# Patient Record
Sex: FEMALE | Race: WHITE | NOT HISPANIC OR LATINO | Employment: UNEMPLOYED | URBAN - METROPOLITAN AREA
[De-identification: names, ages, dates, MRNs, and addresses within clinical notes are randomized per-mention and may not be internally consistent; named-entity substitution may affect disease eponyms.]

---

## 2023-05-24 PROBLEM — Z34.80 SUPERVISION OF OTHER NORMAL PREGNANCY, ANTEPARTUM: Status: ACTIVE | Noted: 2023-05-24

## 2023-08-02 PROBLEM — O99.210 MATERNAL OBESITY AFFECTING PREGNANCY, ANTEPARTUM: Status: ACTIVE | Noted: 2023-08-02

## 2023-08-30 ENCOUNTER — ROUTINE PRENATAL (OUTPATIENT)
Dept: OBSTETRICS AND GYNECOLOGY | Facility: CLINIC | Age: 23
End: 2023-08-30
Payer: COMMERCIAL

## 2023-08-30 ENCOUNTER — OFFICE VISIT (OUTPATIENT)
Dept: OBSTETRICS AND GYNECOLOGY | Facility: HOSPITAL | Age: 23
End: 2023-08-30
Payer: COMMERCIAL

## 2023-08-30 ENCOUNTER — HOSPITAL ENCOUNTER (OUTPATIENT)
Dept: WOMENS IMAGING | Facility: HOSPITAL | Age: 23
Discharge: HOME | End: 2023-08-30
Admitting: OBSTETRICS & GYNECOLOGY
Payer: COMMERCIAL

## 2023-08-30 VITALS — DIASTOLIC BLOOD PRESSURE: 80 MMHG | SYSTOLIC BLOOD PRESSURE: 122 MMHG | WEIGHT: 228.6 LBS | BODY MASS INDEX: 37.46 KG/M2

## 2023-08-30 VITALS
WEIGHT: 226.6 LBS | DIASTOLIC BLOOD PRESSURE: 60 MMHG | BODY MASS INDEX: 36.42 KG/M2 | SYSTOLIC BLOOD PRESSURE: 113 MMHG | HEIGHT: 66 IN

## 2023-08-30 DIAGNOSIS — Z03.73 FETAL ANOMALY SUSPECTED BUT NOT FOUND: ICD-10-CM

## 2023-08-30 DIAGNOSIS — Z34.92 PRENATAL CARE IN SECOND TRIMESTER: Primary | ICD-10-CM

## 2023-08-30 DIAGNOSIS — Z34.90 PREGNANCY, UNSPECIFIED GESTATIONAL AGE: ICD-10-CM

## 2023-08-30 DIAGNOSIS — Z34.80 SUPERVISION OF OTHER NORMAL PREGNANCY, ANTEPARTUM: ICD-10-CM

## 2023-08-30 DIAGNOSIS — O99.210 MATERNAL OBESITY AFFECTING PREGNANCY, ANTEPARTUM: Primary | ICD-10-CM

## 2023-08-30 LAB
GLUCOSE UR STRIP-MCNC: NEGATIVE MG/DL
PROT UR STRIP-MCNC: NEGATIVE MG/DL

## 2023-08-30 PROCEDURE — 76811 OB US DETAILED SNGL FETUS: CPT

## 2023-08-30 NOTE — PROGRESS NOTES
OB FOLLOW UP  CC- Here for care of pregnancy        Heather Garcia is a 22 y.o.  27w2d patient being seen today for her obstetrical follow up. Patient reports no complaints..     Reviewed L&D note from [facility] and no mention of 4th degree laceration but confirmed vacuum delivery. Discussed findings and will continue to monitor fetal growth.     Patient undergoing Glucola testing today.     MBT: O+  Rhogam:  not needed  28 week packet: reviewed with patient , counseled on fetal movement , pediatrician list reviewed, breast pump discussed, and childbirth classes reviewed  TDAP: given today  Ultrasound Today: Yes at [facility]      Patient Active Problem List   Diagnosis    Supervision of other normal pregnancy, antepartum    Maternal obesity affecting pregnancy, antepartum         ROS -   Patient Reports : No Problems  Patient Denies: Loss of Fluid, Vaginal Spotting, Vision Changes, Headaches, Nausea , Vomiting , Contractions, and Epigastric pain  Fetal Movement : normal    The additional following portions of the patient's history were reviewed and updated as appropriate: allergies, current medications, past family history, past medical history, past social history, past surgical history, and problem list.    I have reviewed and agree with the HPI, ROS, and historical information as entered above. Woodrow Agee MD      /80   Wt 104 kg (228 lb 9.6 oz)   LMP 2023   BMI 37.46 kg/mý         EXAM:     Prenatal Vitals  BP: 122/80  Weight: 104 kg (228 lb 9.6 oz)                   Urine Glucose Read-only: Negative  Urine Protein Read-only: Negative         Assessment and Plan    Problem List Items Addressed This Visit    None  Visit Diagnoses       Prenatal care in second trimester    -  Primary    Relevant Orders    CBC (No Diff)    Gestational Screen 1 Hr ([facility])    Antibody Screen    POC Urinalysis Dipstick (Completed)            Pregnancy at 27w2d  1 hr Glucola, CBC, and antibody screen  today  and TDAP given today  Fetal movement/PTL or Labor precautions  Lab(s) Ordered  Patient is on Prenatal vitamins  Reviewed Pre-eclampsia signs/symptoms  Activity and Exercise discussed.  Return in about 2 weeks (around 9/13/2023) for Routine prenatal care.    Woodrow Agee MD  08/30/2023

## 2023-08-30 NOTE — PROGRESS NOTES
No complaints today.  Next f/ with forester today, Planning to talk to him about previous delivery with 4th degree.  Vaginal del vs p c/s   NIPT Low risk, AFP neg

## 2023-08-31 LAB
BLD GP AB SCN SERPL QL: NEGATIVE
ERYTHROCYTE [DISTWIDTH] IN BLOOD BY AUTOMATED COUNT: 13 % (ref 12.3–15.4)
GLUCOSE 1H P 50 G GLC PO SERPL-MCNC: 65 MG/DL (ref 65–139)
HCT VFR BLD AUTO: 37.4 % (ref 34–46.6)
HGB BLD-MCNC: 12.9 G/DL (ref 12–15.9)
MCH RBC QN AUTO: 30.2 PG (ref 26.6–33)
MCHC RBC AUTO-ENTMCNC: 34.5 G/DL (ref 31.5–35.7)
MCV RBC AUTO: 87.6 FL (ref 79–97)
PLATELET # BLD AUTO: 268 10*3/MM3 (ref 140–450)
RBC # BLD AUTO: 4.27 10*6/MM3 (ref 3.77–5.28)
WBC # BLD AUTO: 8.9 10*3/MM3 (ref 3.4–10.8)

## 2023-09-13 ENCOUNTER — ROUTINE PRENATAL (OUTPATIENT)
Dept: OBSTETRICS AND GYNECOLOGY | Facility: CLINIC | Age: 23
End: 2023-09-13
Payer: COMMERCIAL

## 2023-09-13 VITALS — BODY MASS INDEX: 37.89 KG/M2 | SYSTOLIC BLOOD PRESSURE: 118 MMHG | DIASTOLIC BLOOD PRESSURE: 70 MMHG | WEIGHT: 231.2 LBS

## 2023-09-13 DIAGNOSIS — Z34.92 PRENATAL CARE IN SECOND TRIMESTER: Primary | ICD-10-CM

## 2023-09-13 LAB
CLARITY, POC: CLEAR
COLOR UR: ABNORMAL
GLUCOSE UR STRIP-MCNC: NEGATIVE MG/DL
PROT UR STRIP-MCNC: ABNORMAL MG/DL

## 2023-09-13 NOTE — PATIENT INSTRUCTIONS
Prenatal Care  Prenatal care is health care during pregnancy. It helps you and your unborn baby (fetus) stay as healthy as possible. Prenatal care may be provided by a midwife, a family practice doctor, a mid-level practitioner (nurse practitioner or physician assistant), or a childbirth and pregnancy doctor (obstetrician).  How does this affect me?  During pregnancy, you will be closely monitored for any new conditions that might develop. To lower your risk of pregnancy complications, you and your health care provider will talk about any underlying conditions you have.  How does this affect my baby?  Early and consistent prenatal care increases the chance that your baby will be healthy during pregnancy. Prenatal care lowers the risk that your baby will be:  Born early (prematurely).  Smaller than expected at birth (small for gestational age).  What can I expect at the first prenatal care visit?  Your first prenatal care visit will likely be the longest. You should schedule your first prenatal care visit as soon as you know that you are pregnant. Your first visit is a good time to talk about any questions or concerns you have about pregnancy.  Medical history  At your visit, you and your health care provider will talk about your medical history, including:  Any past pregnancies.  Your family's medical history.  Medical history of the baby's father.  Any long-term (chronic) health conditions you have and how you manage them.  Any surgeries or procedures you have had.  Any current over-the-counter or prescription medicines, herbs, or supplements that you are taking.  Other factors that could pose a risk to your baby, including:  Exposure to harmful chemicals or radiation at work or at home.  Any substance use, including tobacco, alcohol, and drug use.  Your home setting and your stress levels, including:  Exposure to abuse or violence.  Household financial strain.  Your daily health habits, including diet and  exercise.  Tests and screenings  Your health care provider will:  Measure your weight, height, and blood pressure.  Do a physical exam, including a pelvic and breast exam.  Perform blood tests and urine tests to check for:  Urinary tract infection.  Sexually transmitted infections (STIs).  Low iron levels in your blood (anemia).  Blood type and certain proteins on red blood cells (Rh antibodies).  Infections and immunity to viruses, such as hepatitis B and rubella.  HIV (human immunodeficiency virus).  Discuss your options for genetic screening.  Tips about staying healthy  Your health care provider will also give you information about how to keep yourself and your baby healthy, including:  Nutrition and taking vitamins.  Physical activity.  How to manage pregnancy symptoms such as nausea and vomiting (morning sickness).  Infections and substances that may be harmful to your baby and how to avoid them.  Food safety.  Dental care.  Working.  Travel.  Warning signs to watch for and when to call your health care provider.  How often will I have prenatal care visits?  After your first prenatal care visit, you will have regular visits throughout your pregnancy. The visit schedule is often as follows:  Up to week 28 of pregnancy: once every 4 weeks.  28-36 weeks: once every 2 weeks.  After 36 weeks: every week until delivery.  Some women may have visits more or less often depending on any underlying health conditions and the health of the baby.  Keep all follow-up and prenatal care visits. This is important.  What happens during routine prenatal care visits?  Your health care provider will:  Measure your weight and blood pressure.  Check for fetal heart sounds.  Measure the height of your uterus in your abdomen (fundal height). This may be measured starting around week 20 of pregnancy.  Check the position of your baby inside your uterus.  Ask questions about your diet, sleeping patterns, and whether you can feel the baby  move.  Review warning signs to watch for and signs of labor.  Ask about any pregnancy symptoms you are having and how you are dealing with them. Symptoms may include:  Headaches.  Nausea and vomiting.  Vaginal discharge.  Swelling.  Fatigue.  Constipation.  Changes in your vision.  Feeling persistently sad or anxious.  Any discomfort, including back or pelvic pain.  Bleeding or spotting.  Make a list of questions to ask your health care provider at your routine visits.  What tests might I have during prenatal care visits?  You may have blood, urine, and imaging tests throughout your pregnancy, such as:  Urine tests to check for glucose, protein, or signs of infection.  Glucose tests to check for a form of diabetes that can develop during pregnancy (gestational diabetes mellitus). This is usually done around week 24 of pregnancy.  Ultrasounds to check your baby's growth and development, to check for birth defects, and to check your baby's well-being. These can also help to decide when you should deliver your baby.  A test to check for group B strep (GBS) infection. This is usually done around week 36 of pregnancy.  Genetic testing. This may include blood, fluid, or tissue sampling, or imaging tests, such as an ultrasound. Some genetic tests are done during the first trimester and some are done during the second trimester.  What else can I expect during prenatal care visits?  Your health care provider may recommend getting certain vaccines during pregnancy. These may include:  A yearly flu shot (annual influenza vaccine). This is especially important if you will be pregnant during flu season.  Tdap (tetanus, diphtheria, pertussis) vaccine. Getting this vaccine during pregnancy can protect your baby from whooping cough (pertussis) after birth. This vaccine may be recommended between weeks 27 and 36 of pregnancy.  A COVID-19 vaccine.  Later in your pregnancy, your health care provider may give you information  about:  Childbirth and breastfeeding classes.  Choosing a health care provider for your baby.  Umbilical cord banking.  Breastfeeding.  Birth control after your baby is born.  The hospital labor and delivery unit and how to set up a tour.  Registering at the hospital before you go into labor.  Where to find more information  Office on Women's Health: womenshealth.gov  American Pregnancy Association: americanpregnancy.org  March of Dimes: marchofdimes.org  Summary  Prenatal care helps you and your baby stay as healthy as possible during pregnancy.  Your first prenatal care visit will most likely be the longest.  You will have visits and tests throughout your pregnancy to monitor your health and your baby's health.  Bring a list of questions to your visits to ask your health care provider.  Make sure to keep all follow-up and prenatal care visits.  This information is not intended to replace advice given to you by your health care provider. Make sure you discuss any questions you have with your health care provider.  Document Revised: 09/30/2021 Document Reviewed: 09/30/2021  [employer] Patient Education © 2023 [employer]

## 2023-09-13 NOTE — PROGRESS NOTES
OB FOLLOW UP  CC- Here for care of pregnancy        Heather Garcia is a 22 y.o.  29w2d patient being seen today for her obstetrical follow up visit. Patient reports no complaints.     Her prenatal care is complicated by (and status) :    Patient Active Problem List   Diagnosis    Supervision of other normal pregnancy, antepartum    Maternal obesity affecting pregnancy, antepartum       TDAP status: received at last visit  Rhogam status: was not indicated  28 week labs: Reviewed and normal  Ultrasound Today: No  Non Stress Test: No.      ROS -   Patient Denies: Loss of Fluid, Vaginal Spotting, Vision Changes, Headaches, Nausea , Vomiting , Contractions, and Epigastric pain  Fetal Movement : normal  All other systems reviewed and are negative.       The additional following portions of the patient's history were reviewed and updated as appropriate: allergies, current medications, past family history, past medical history, past social history, past surgical history, and problem list.    I have reviewed and agree with the HPI, ROS, and historical information as entered above. Woodrow Agee MD      /70   Wt 105 kg (231 lb 3.2 oz)   LMP 2023   BMI 37.89 kg/m²         EXAM:     Prenatal Vitals  BP: 118/70  Weight: 105 kg (231 lb 3.2 oz)                   Urine Glucose Read-only: Negative  Urine Protein Read-only: (!) Trace           Assessment and Plan    Problem List Items Addressed This Visit    None  Visit Diagnoses       Prenatal care in second trimester    -  Primary    Relevant Orders    POC Urinalysis Dipstick (Completed)            Pregnancy at 29w2d  Fetal status reassuring.  28 week labs reviewed.    Activity and Exercise discussed.  Fetal movement/PTL or Labor precautions  Patient is on Prenatal vitamins  Reviewed Pre-eclampsia signs/symptoms  Return in about 2 weeks (around 2023) for Routine prenatal care.    Woodrow Agee MD  2023     Yes

## 2023-09-27 ENCOUNTER — ROUTINE PRENATAL (OUTPATIENT)
Dept: OBSTETRICS AND GYNECOLOGY | Facility: CLINIC | Age: 23
End: 2023-09-27
Payer: COMMERCIAL

## 2023-09-27 VITALS — SYSTOLIC BLOOD PRESSURE: 112 MMHG | BODY MASS INDEX: 38.87 KG/M2 | DIASTOLIC BLOOD PRESSURE: 68 MMHG | WEIGHT: 237.2 LBS

## 2023-09-27 DIAGNOSIS — Z34.93 PRENATAL CARE IN THIRD TRIMESTER: Primary | ICD-10-CM

## 2023-09-27 LAB
GLUCOSE UR STRIP-MCNC: NEGATIVE MG/DL
PROT UR STRIP-MCNC: ABNORMAL MG/DL

## 2023-09-27 NOTE — PROGRESS NOTES
"      OB FOLLOW UP  CC- Here for care of pregnancy        Heather Garcia is a 22 y.o.  31w2d patient being seen today for her obstetrical follow up visit. Patient reports BH contractions, visual changes (That is not accompanied with a headache), low back pain (She denies dysuria), and heartburn (She is not taking OTC medication for treatment currently). Patient describes vision changes as \"floaters\" and \"stars\". Patient states that vision changes occur a couple of times per day. Patient denies HA, lightheadedness, and dizziness with vision changes.     Her prenatal care is complicated by (and status) :    Patient Active Problem List   Diagnosis    Supervision of other normal pregnancy, antepartum    Maternal obesity affecting pregnancy, antepartum       TDAP status: received at last visit  Rhogam status: was not indicated  28 week labs: Reviewed and normal  Ultrasound Today: No  Non Stress Test: No      ROS -   Patient Reports :  see above  Patient Denies: Loss of Fluid, Vaginal Spotting, Headaches, Nausea , Vomiting , and Epigastric pain  Fetal Movement : normal  All other systems reviewed and are negative.       The additional following portions of the patient's history were reviewed and updated as appropriate: allergies and current medications.    I have reviewed and agree with the HPI, ROS, and historical information as entered above. Woodrow Agee MD      /68   Wt 108 kg (237 lb 3.2 oz)   LMP 2023   BMI 38.87 kg/m²         EXAM:     Prenatal Vitals  BP: 112/68  Weight: 108 kg (237 lb 3.2 oz)                   Urine Glucose Read-only: Negative  Urine Protein Read-only: (!) Trace           Assessment and Plan    Problem List Items Addressed This Visit    None  Visit Diagnoses       Prenatal care in third trimester    -  Primary    Relevant Orders    POC Urinalysis Dipstick (Completed)            Pregnancy at 31w2d  Fetal status reassuring.  28 week labs reviewed.    Activity and " Exercise discussed.  Fetal movement/PTL or Labor precautions  Patient is on Prenatal vitamins  Reviewed Pre-eclampsia signs/symptoms  Return in about 2 weeks (around 10/11/2023) for Routine prenatal care.    Woodrow Agee MD  09/27/2023

## 2023-10-11 ENCOUNTER — ROUTINE PRENATAL (OUTPATIENT)
Dept: OBSTETRICS AND GYNECOLOGY | Facility: CLINIC | Age: 23
End: 2023-10-11
Payer: COMMERCIAL

## 2023-10-11 VITALS — BODY MASS INDEX: 39.2 KG/M2 | WEIGHT: 239.2 LBS | DIASTOLIC BLOOD PRESSURE: 68 MMHG | SYSTOLIC BLOOD PRESSURE: 120 MMHG

## 2023-10-11 DIAGNOSIS — Z34.90 PRENATAL CARE, ANTEPARTUM: Primary | ICD-10-CM

## 2023-10-11 LAB
GLUCOSE UR STRIP-MCNC: NEGATIVE MG/DL
PROT UR STRIP-MCNC: ABNORMAL MG/DL

## 2023-10-11 NOTE — PROGRESS NOTES
OB FOLLOW UP  CC- Here for care of pregnancy        Heather Garcia is a 22 y.o.  33w2d patient being seen today for her obstetrical follow up visit. Patient reports occasional headaches with seeing spots. Pt states she took her BP when she saw spots and it was 147/85. Denies epigastric pain.   Normal BP today  Her prenatal care is complicated by (and status) :   Patient Active Problem List   Diagnosis    Supervision of other normal pregnancy, antepartum    Maternal obesity affecting pregnancy, antepartum       Flu Status: Will give in office today  Ultrasound Today: No  Non Stress Test: No.    ROS -   Patient Denies: Loss of Fluid, Vaginal Spotting, Nausea , Vomiting , Contractions, and Epigastric pain  Fetal Movement : normal  All other systems reviewed and are negative.       The additional following portions of the patient's history were reviewed and updated as appropriate: allergies, current medications, past family history, past medical history, past social history, past surgical history, and problem list.    I have reviewed and agree with the HPI, ROS, and historical information as entered above. Woodrow Agee MD      /68   Wt 109 kg (239 lb 3.2 oz)   LMP 2023   BMI 39.20 kg/mý       EXAM:     Prenatal Vitals  BP: 120/68  Weight: 109 kg (239 lb 3.2 oz)                   Urine Glucose Read-only: Negative  Urine Protein Read-only: (!) Trace           Assessment and Plan    Problem List Items Addressed This Visit    None  Visit Diagnoses       Prenatal care, antepartum    -  Primary    Relevant Orders    POC Urinalysis Dipstick (Completed)    Fluzone (or Fluarix & Flulaval for VFC) >6 Mos ([id number]) (Completed)            Pregnancy at 33w2d  Fetal status reassuring.   Activity and Exercise discussed.  Fetal movements and signs symptoms of  labor discussed.   Will monitor fetal growth with US at 36 weeks.   Preeclampsia precautions discussed and all questions answered.    Return in about 2 weeks (around 10/25/2023) for Routine prenatal care.    Woodrow Agee MD  10/11/2023

## 2023-10-25 ENCOUNTER — ROUTINE PRENATAL (OUTPATIENT)
Dept: OBSTETRICS AND GYNECOLOGY | Facility: CLINIC | Age: 23
End: 2023-10-25
Payer: COMMERCIAL

## 2023-10-25 VITALS — BODY MASS INDEX: 40.25 KG/M2 | WEIGHT: 245.6 LBS | SYSTOLIC BLOOD PRESSURE: 118 MMHG | DIASTOLIC BLOOD PRESSURE: 78 MMHG

## 2023-10-25 DIAGNOSIS — Z34.93 PRENATAL CARE IN THIRD TRIMESTER: Primary | ICD-10-CM

## 2023-10-25 LAB
GLUCOSE UR STRIP-MCNC: NEGATIVE MG/DL
PROT UR STRIP-MCNC: NEGATIVE MG/DL

## 2023-10-25 NOTE — PROGRESS NOTES
OB FOLLOW UP  CC- Here for care of pregnancy        Heather Garcia is a 22 y.o.  35w2d patient being seen today for her obstetrical follow up visit. Patient reports swelling in the upper and lower extremities. It is Non-Pitting. Patient also reports occasional rizwana fink.    Her prenatal care is complicated by (and status) : None  Patient Active Problem List   Diagnosis    Supervision of other normal pregnancy, antepartum    Maternal obesity affecting pregnancy, antepartum       Flu Status: Already given in current flu season  Ultrasound Today: No  Non Stress Test: No.    ROS -   Patient Reports :  See above  Patient Denies: Loss of Fluid, Vaginal Spotting, Vision Changes, Contractions, and Epigastric pain  Fetal Movement : normal  All other systems reviewed and are negative.       The additional following portions of the patient's history were reviewed and updated as appropriate: allergies, current medications, past family history, past medical history, past social history, past surgical history, and problem list.    I have reviewed and agree with the HPI, ROS, and historical information as entered above. Woodrow Agee MD      /78   Wt 111 kg (245 lb 9.6 oz)   LMP 2023   BMI 40.25 kg/m²       EXAM:     Prenatal Vitals  BP: 118/78  Weight: 111 kg (245 lb 9.6 oz)                   Urine Glucose Read-only: Negative  Urine Protein Read-only: Negative           Assessment and Plan    Problem List Items Addressed This Visit    None  Visit Diagnoses       Prenatal care in third trimester    -  Primary    Relevant Orders    POC Urinalysis Dipstick (Completed)    Large for dates        Relevant Orders    US Ob Follow Up Transabdominal Approach            Pregnancy at 35w2d  Fetal status reassuring.   Activity and Exercise discussed.  Fetal movement/PTL or Labor precautions  U/S ordered at follow up  Patient is on Prenatal vitamins  Reviewed Pre-eclampsia signs/symptoms  GBS next  visit  Return in about 1 week (around 11/1/2023) for Routine prenatal care and US.  If you can't get her in to see me here, then Monday in [facility] is ok.    Woodrow Agee MD  10/25/2023

## 2023-10-30 ENCOUNTER — ROUTINE PRENATAL (OUTPATIENT)
Dept: OBSTETRICS AND GYNECOLOGY | Facility: CLINIC | Age: 23
End: 2023-10-30
Payer: COMMERCIAL

## 2023-10-30 VITALS — DIASTOLIC BLOOD PRESSURE: 70 MMHG | BODY MASS INDEX: 40.31 KG/M2 | SYSTOLIC BLOOD PRESSURE: 120 MMHG | WEIGHT: 246 LBS

## 2023-10-30 DIAGNOSIS — Z3A.36 36 WEEKS GESTATION OF PREGNANCY: ICD-10-CM

## 2023-10-30 DIAGNOSIS — Z36.85 ENCOUNTER FOR ANTENATAL SCREENING FOR STREPTOCOCCUS B: Primary | ICD-10-CM

## 2023-10-30 LAB
EXPIRATION DATE: 1
GLUCOSE UR STRIP-MCNC: NEGATIVE MG/DL
Lab: 1
PROT UR STRIP-MCNC: ABNORMAL MG/DL

## 2023-10-30 NOTE — PROGRESS NOTES
OB FOLLOW UP  CC- Here for care of pregnancy        Heather Garcia is a 22 y.o.  36w0d patient being seen today for her obstetrical follow up visit. Patient reports irregular contractions .   Growth US reviewed and all questions answered.     Her prenatal care is complicated by (and status) : None  Patient Active Problem List   Diagnosis    Supervision of other normal pregnancy, antepartum    Maternal obesity affecting pregnancy, antepartum       GBS Status: Done Today. She is not allergic to PCN.    Allergies   Allergen Reactions    Amoxicillin-Pot Clavulanate Anaphylaxis    Sulfa Antibiotics Rash    Sulfamethoxazole-Trimethoprim Rash          Flu Status: Already given in current flu season  Her Delivery Plan is: Desires IOL at 39wks. Scheduled    US today: yes  Non Stress Test: No.    ROS -   Patient Reports : Contractions  Patient Denies: Loss of Fluid, Vaginal Spotting, Vision Changes, Headaches, Nausea , and Vomiting   Fetal Movement : normal  All other systems reviewed and are negative.       The additional following portions of the patient's history were reviewed and updated as appropriate: allergies and current medications.    I have reviewed and agree with the HPI, ROS, and historical information as entered above. Woodrow Agee MD'      EXAM:     Prenatal Vitals  BP: 120/70  Weight: 112 kg (246 lb)                  Urine Glucose Read-only: Negative  Urine Protein Read-only: (!) Trace           Assessment and Plan    Problem List Items Addressed This Visit    None  Visit Diagnoses       Encounter for  screening for Streptococcus B    -  Primary    Relevant Orders    Group B Streptococcus Culture - Swab, Vaginal/Rectum    36 weeks gestation of pregnancy        Relevant Orders    POC Glucose, Urine, Qualitative, Dipstick (Completed)    POC Protein, Urine, Qualitative, Dipstick (Completed)            Pregnancy at 36w0d  Fetal status reassuring.   Lab(s) Ordered  Reviewed Pre-eclampsia  signs/symptoms  Discussed IOL options with patient. Pt. desires IOL at 39 weeks.   Delivery options reviewed with patient  Signs of labor reviewed  Kick counts reviewed  Activity and Exercise discussed.  Return in about 1 week (around 11/6/2023) for Routine prenatal care in [place].    Woodrow Agee MD  10/30/2023

## 2023-11-03 LAB — B-HEM STREP SPEC QL CULT: NEGATIVE

## 2023-11-07 ENCOUNTER — PREP FOR SURGERY (OUTPATIENT)
Dept: OTHER | Facility: HOSPITAL | Age: 23
End: 2023-11-07
Payer: COMMERCIAL

## 2023-11-07 ENCOUNTER — ROUTINE PRENATAL (OUTPATIENT)
Dept: OBSTETRICS AND GYNECOLOGY | Facility: CLINIC | Age: 23
End: 2023-11-07
Payer: COMMERCIAL

## 2023-11-07 VITALS — DIASTOLIC BLOOD PRESSURE: 78 MMHG | WEIGHT: 248.2 LBS | SYSTOLIC BLOOD PRESSURE: 128 MMHG | BODY MASS INDEX: 40.67 KG/M2

## 2023-11-07 DIAGNOSIS — Z34.80 SUPERVISION OF OTHER NORMAL PREGNANCY, ANTEPARTUM: Primary | ICD-10-CM

## 2023-11-07 DIAGNOSIS — Z34.93 PRENATAL CARE IN THIRD TRIMESTER: Primary | ICD-10-CM

## 2023-11-07 LAB
GLUCOSE UR STRIP-MCNC: NEGATIVE MG/DL
PROT UR STRIP-MCNC: NEGATIVE MG/DL

## 2023-11-07 RX ORDER — ACETAMINOPHEN 325 MG/1
650 TABLET ORAL EVERY 4 HOURS PRN
OUTPATIENT
Start: 2023-11-07

## 2023-11-07 RX ORDER — HYDROCODONE BITARTRATE AND ACETAMINOPHEN 5; 325 MG/1; MG/1
1 TABLET ORAL EVERY 4 HOURS PRN
OUTPATIENT
Start: 2023-11-07 | End: 2023-11-17

## 2023-11-07 RX ORDER — ONDANSETRON 2 MG/ML
4 INJECTION INTRAMUSCULAR; INTRAVENOUS EVERY 6 HOURS PRN
OUTPATIENT
Start: 2023-11-07

## 2023-11-07 RX ORDER — SODIUM CHLORIDE 0.9 % (FLUSH) 0.9 %
10 SYRINGE (ML) INJECTION AS NEEDED
OUTPATIENT
Start: 2023-11-07

## 2023-11-07 RX ORDER — NALOXONE HCL 0.4 MG/ML
0.4 VIAL (ML) INJECTION
OUTPATIENT
Start: 2023-11-07

## 2023-11-07 RX ORDER — PROMETHAZINE HYDROCHLORIDE 12.5 MG/1
12.5 SUPPOSITORY RECTAL EVERY 6 HOURS PRN
OUTPATIENT
Start: 2023-11-07

## 2023-11-07 RX ORDER — SODIUM CHLORIDE 0.9 % (FLUSH) 0.9 %
10 SYRINGE (ML) INJECTION EVERY 12 HOURS SCHEDULED
OUTPATIENT
Start: 2023-11-07

## 2023-11-07 RX ORDER — MAGNESIUM CARB/ALUMINUM HYDROX 105-160MG
30 TABLET,CHEWABLE ORAL ONCE
OUTPATIENT
Start: 2023-11-07 | End: 2023-11-07

## 2023-11-07 RX ORDER — MORPHINE SULFATE 1 MG/ML
1 INJECTION, SOLUTION EPIDURAL; INTRATHECAL; INTRAVENOUS EVERY 4 HOURS PRN
OUTPATIENT
Start: 2023-11-07 | End: 2023-11-14

## 2023-11-07 RX ORDER — LIDOCAINE HYDROCHLORIDE 10 MG/ML
0.5 INJECTION, SOLUTION EPIDURAL; INFILTRATION; INTRACAUDAL; PERINEURAL ONCE AS NEEDED
OUTPATIENT
Start: 2023-11-07

## 2023-11-07 RX ORDER — METHYLERGONOVINE MALEATE 0.2 MG/ML
200 INJECTION INTRAVENOUS ONCE AS NEEDED
OUTPATIENT
Start: 2023-11-07

## 2023-11-07 RX ORDER — IBUPROFEN 600 MG/1
600 TABLET ORAL EVERY 6 HOURS PRN
OUTPATIENT
Start: 2023-11-07

## 2023-11-07 RX ORDER — OXYTOCIN/0.9 % SODIUM CHLORIDE 30/500 ML
250 PLASTIC BAG, INJECTION (ML) INTRAVENOUS CONTINUOUS
OUTPATIENT
Start: 2023-11-07 | End: 2023-11-07

## 2023-11-07 RX ORDER — OXYTOCIN/0.9 % SODIUM CHLORIDE 30/500 ML
999 PLASTIC BAG, INJECTION (ML) INTRAVENOUS ONCE
OUTPATIENT
Start: 2023-11-07 | End: 2023-11-07

## 2023-11-07 RX ORDER — ONDANSETRON 4 MG/1
4 TABLET, FILM COATED ORAL EVERY 6 HOURS PRN
OUTPATIENT
Start: 2023-11-07

## 2023-11-07 RX ORDER — CARBOPROST TROMETHAMINE 250 UG/ML
250 INJECTION, SOLUTION INTRAMUSCULAR AS NEEDED
OUTPATIENT
Start: 2023-11-07

## 2023-11-07 RX ORDER — PROMETHAZINE HYDROCHLORIDE 12.5 MG/1
12.5 TABLET ORAL EVERY 6 HOURS PRN
OUTPATIENT
Start: 2023-11-07

## 2023-11-07 RX ORDER — TERBUTALINE SULFATE 1 MG/ML
0.25 INJECTION, SOLUTION SUBCUTANEOUS AS NEEDED
OUTPATIENT
Start: 2023-11-07

## 2023-11-07 RX ORDER — OXYTOCIN/0.9 % SODIUM CHLORIDE 30/500 ML
2-20 PLASTIC BAG, INJECTION (ML) INTRAVENOUS
OUTPATIENT
Start: 2023-11-07

## 2023-11-07 RX ORDER — MISOPROSTOL 200 UG/1
800 TABLET ORAL AS NEEDED
OUTPATIENT
Start: 2023-11-07

## 2023-11-07 RX ORDER — SODIUM CHLORIDE 9 MG/ML
40 INJECTION, SOLUTION INTRAVENOUS AS NEEDED
OUTPATIENT
Start: 2023-11-07

## 2023-11-07 RX ORDER — CITRIC ACID/SODIUM CITRATE 334-500MG
30 SOLUTION, ORAL ORAL ONCE AS NEEDED
OUTPATIENT
Start: 2023-11-07

## 2023-11-07 RX ORDER — SODIUM CHLORIDE, SODIUM LACTATE, POTASSIUM CHLORIDE, CALCIUM CHLORIDE 600; 310; 30; 20 MG/100ML; MG/100ML; MG/100ML; MG/100ML
125 INJECTION, SOLUTION INTRAVENOUS CONTINUOUS
OUTPATIENT
Start: 2023-11-07

## 2023-11-07 NOTE — PROGRESS NOTES
OB FOLLOW UP  CC- Here for care of pregnancy        Heather Garcia is a 22 y.o.  37w1d patient being seen today for her obstetrical follow up visit. Patient reports intermittent contractions in her lower abdomen and lower back, she denies dysuria. She has trace swelling in her BLE.     Her prenatal care is complicated by (and status) :   Patient Active Problem List   Diagnosis    Supervision of other normal pregnancy, antepartum    Maternal obesity affecting pregnancy, antepartum       GBS Status:   Strep Gp B Culture   Date Value Ref Range Status   10/30/2023 Negative Negative Final     Comment:     Centers for Disease Control and Prevention (CDC) and American Congress  of Obstetricians and Gynecologists (ACOG) guidelines for prevention of   group B streptococcal (GBS) disease specify co-collection of  a vaginal and rectal swab specimen to maximize sensitivity of GBS  detection. Per the CDC and ACOG, swabbing both the lower vagina and  rectum substantially increases the yield of detection compared with  sampling the vagina alone.  Penicillin G, ampicillin, or cefazolin are indicated for intrapartum  prophylaxis of  GBS colonization. Reflex susceptibility  testing should be performed prior to use of clindamycin only on GBS  isolates from penicillin-allergic women who are considered a high risk  for anaphylaxis. Treatment with vancomycin without additional testing  is warranted if resistance to clindamycin is noted.           Allergies   Allergen Reactions    Amoxicillin-Pot Clavulanate Anaphylaxis    Sulfa Antibiotics Rash    Sulfamethoxazole-Trimethoprim Rash          Flu Status: Already given in current flu season  Her Delivery Plan is: [facility] IOL at 39wks. Scheduled 23   today: no  Non Stress Test: No.    ROS -   Patient Denies: Loss of Fluid, Vaginal Spotting, Vision Changes, Headaches, Nausea , Vomiting , and Epigastric pain  Fetal Movement : normal  All other systems  reviewed and are negative.       The additional following portions of the patient's history were reviewed and updated as appropriate: allergies, current medications, past family history, past medical history, past social history, past surgical history, and problem list.    I have reviewed and agree with the HPI, ROS, and historical information as entered above. Woodrow Agee MD        EXAM:     Prenatal Vitals  BP: 128/78  Weight: 113 kg (248 lb 3.2 oz)                  Urine Glucose Read-only: Negative  Urine Protein Read-only: Negative           Assessment and Plan    Problem List Items Addressed This Visit    None  Visit Diagnoses       Prenatal care in third trimester    -  Primary    Relevant Orders    POC Urinalysis Dipstick (Completed)            Pregnancy at 37w1d  Fetal status reassuring.   Reviewed Pre-eclampsia signs/symptoms  Reviewed upcoming IOL with patient. Instructions given.  Delivery options reviewed with patient  Signs of labor reviewed  Kick counts reviewed  Activity and Exercise discussed.  Return in about 1 week (around 11/14/2023) for Routine prenatal care.    Woodrow Agee MD  11/07/2023

## 2023-11-09 ENCOUNTER — TELEPHONE (OUTPATIENT)
Dept: OBSTETRICS AND GYNECOLOGY | Facility: CLINIC | Age: 23
End: 2023-11-09
Payer: COMMERCIAL

## 2023-11-09 ENCOUNTER — HOSPITAL ENCOUNTER (OUTPATIENT)
Facility: HOSPITAL | Age: 23
Setting detail: OBSERVATION
LOS: 1 days | Discharge: HOME | End: 2023-11-10
Attending: OBSTETRICS & GYNECOLOGY | Admitting: OBSTETRICS & GYNECOLOGY
Payer: COMMERCIAL

## 2023-11-09 PROBLEM — Z37.9 NORMAL LABOR: Status: ACTIVE | Noted: 2023-11-09

## 2023-11-09 LAB
ABO GROUP BLD: NORMAL
ALP SERPL-CCNC: 197 U/L (ref 39–117)
ALT SERPL W P-5'-P-CCNC: 14 U/L (ref 1–33)
AST SERPL-CCNC: 19 U/L (ref 1–32)
BILIRUB SERPL-MCNC: 0.4 MG/DL (ref 0–1.2)
BLD GP AB SCN SERPL QL: NEGATIVE
CREAT SERPL-MCNC: 0.56 MG/DL (ref 0.57–1)
DEPRECATED RDW RBC AUTO: 38.5 FL (ref 37–54)
ERYTHROCYTE [DISTWIDTH] IN BLOOD BY AUTOMATED COUNT: 12.6 % (ref 12.3–15.4)
HCT VFR BLD AUTO: 37.8 % (ref 34–46.6)
HGB BLD-MCNC: 12.3 G/DL (ref 12–15.9)
LDH SERPL-CCNC: 237 U/L (ref 135–214)
MCH RBC QN AUTO: 27.6 PG (ref 26.6–33)
MCHC RBC AUTO-ENTMCNC: 32.5 G/DL (ref 31.5–35.7)
MCV RBC AUTO: 84.9 FL (ref 79–97)
PLATELET # BLD AUTO: 250 10*3/MM3 (ref 140–450)
PMV BLD AUTO: 9.3 FL (ref 6–12)
RBC # BLD AUTO: 4.45 10*6/MM3 (ref 3.77–5.28)
RH BLD: POSITIVE
T&S EXPIRATION DATE: NORMAL
URATE SERPL-MCNC: 4.9 MG/DL (ref 2.4–5.7)
WBC NRBC COR # BLD: 11.33 10*3/MM3 (ref 3.4–10.8)

## 2023-11-09 PROCEDURE — 86900 BLOOD TYPING SEROLOGIC ABO: CPT | Performed by: OBSTETRICS & GYNECOLOGY

## 2023-11-09 PROCEDURE — 25810000003 LACTATED RINGERS PER 1000 ML: Performed by: OBSTETRICS & GYNECOLOGY

## 2023-11-09 PROCEDURE — 82247 BILIRUBIN TOTAL: CPT | Performed by: OBSTETRICS & GYNECOLOGY

## 2023-11-09 PROCEDURE — 85027 COMPLETE CBC AUTOMATED: CPT | Performed by: OBSTETRICS & GYNECOLOGY

## 2023-11-09 PROCEDURE — 86850 RBC ANTIBODY SCREEN: CPT | Performed by: OBSTETRICS & GYNECOLOGY

## 2023-11-09 PROCEDURE — 82565 ASSAY OF CREATININE: CPT | Performed by: OBSTETRICS & GYNECOLOGY

## 2023-11-09 PROCEDURE — 84460 ALANINE AMINO (ALT) (SGPT): CPT | Performed by: OBSTETRICS & GYNECOLOGY

## 2023-11-09 PROCEDURE — 83615 LACTATE (LD) (LDH) ENZYME: CPT | Performed by: OBSTETRICS & GYNECOLOGY

## 2023-11-09 PROCEDURE — G0463 HOSPITAL OUTPT CLINIC VISIT: HCPCS

## 2023-11-09 PROCEDURE — 59025 FETAL NON-STRESS TEST: CPT

## 2023-11-09 PROCEDURE — 84075 ASSAY ALKALINE PHOSPHATASE: CPT | Performed by: OBSTETRICS & GYNECOLOGY

## 2023-11-09 PROCEDURE — 84450 TRANSFERASE (AST) (SGOT): CPT | Performed by: OBSTETRICS & GYNECOLOGY

## 2023-11-09 PROCEDURE — 84550 ASSAY OF BLOOD/URIC ACID: CPT | Performed by: OBSTETRICS & GYNECOLOGY

## 2023-11-09 PROCEDURE — 86901 BLOOD TYPING SEROLOGIC RH(D): CPT | Performed by: OBSTETRICS & GYNECOLOGY

## 2023-11-09 RX ORDER — LIDOCAINE HYDROCHLORIDE 10 MG/ML
0.5 INJECTION, SOLUTION EPIDURAL; INFILTRATION; INTRACAUDAL; PERINEURAL ONCE AS NEEDED
Status: DISCONTINUED | OUTPATIENT
Start: 2023-11-09 | End: 2023-11-10 | Stop reason: HOSPADM

## 2023-11-09 RX ORDER — FENTANYL CITRATE 50 UG/ML
25 INJECTION, SOLUTION INTRAMUSCULAR; INTRAVENOUS
Status: DISCONTINUED | OUTPATIENT
Start: 2023-11-09 | End: 2023-11-10 | Stop reason: HOSPADM

## 2023-11-09 RX ORDER — PROMETHAZINE HYDROCHLORIDE 12.5 MG/1
12.5 TABLET ORAL EVERY 6 HOURS PRN
Status: DISCONTINUED | OUTPATIENT
Start: 2023-11-09 | End: 2023-11-10 | Stop reason: HOSPADM

## 2023-11-09 RX ORDER — SODIUM CHLORIDE 0.9 % (FLUSH) 0.9 %
10 SYRINGE (ML) INJECTION EVERY 12 HOURS SCHEDULED
Status: DISCONTINUED | OUTPATIENT
Start: 2023-11-09 | End: 2023-11-10 | Stop reason: HOSPADM

## 2023-11-09 RX ORDER — PROMETHAZINE HYDROCHLORIDE 12.5 MG/1
12.5 SUPPOSITORY RECTAL EVERY 6 HOURS PRN
Status: DISCONTINUED | OUTPATIENT
Start: 2023-11-09 | End: 2023-11-10 | Stop reason: HOSPADM

## 2023-11-09 RX ORDER — SODIUM CHLORIDE, SODIUM LACTATE, POTASSIUM CHLORIDE, CALCIUM CHLORIDE 600; 310; 30; 20 MG/100ML; MG/100ML; MG/100ML; MG/100ML
125 INJECTION, SOLUTION INTRAVENOUS CONTINUOUS
Status: DISCONTINUED | OUTPATIENT
Start: 2023-11-09 | End: 2023-11-10 | Stop reason: HOSPADM

## 2023-11-09 RX ORDER — ONDANSETRON 2 MG/ML
4 INJECTION INTRAMUSCULAR; INTRAVENOUS EVERY 6 HOURS PRN
Status: DISCONTINUED | OUTPATIENT
Start: 2023-11-09 | End: 2023-11-10 | Stop reason: HOSPADM

## 2023-11-09 RX ORDER — OXYTOCIN/0.9 % SODIUM CHLORIDE 30/500 ML
999 PLASTIC BAG, INJECTION (ML) INTRAVENOUS ONCE
Status: DISCONTINUED | OUTPATIENT
Start: 2023-11-09 | End: 2023-11-10 | Stop reason: HOSPADM

## 2023-11-09 RX ORDER — IBUPROFEN 600 MG/1
600 TABLET ORAL EVERY 6 HOURS PRN
Status: DISCONTINUED | OUTPATIENT
Start: 2023-11-09 | End: 2023-11-10 | Stop reason: HOSPADM

## 2023-11-09 RX ORDER — ACETAMINOPHEN 325 MG/1
650 TABLET ORAL EVERY 4 HOURS PRN
Status: DISCONTINUED | OUTPATIENT
Start: 2023-11-09 | End: 2023-11-09 | Stop reason: SDUPTHER

## 2023-11-09 RX ORDER — MAGNESIUM CARB/ALUMINUM HYDROX 105-160MG
30 TABLET,CHEWABLE ORAL ONCE
Status: DISCONTINUED | OUTPATIENT
Start: 2023-11-09 | End: 2023-11-10 | Stop reason: HOSPADM

## 2023-11-09 RX ORDER — OXYTOCIN/0.9 % SODIUM CHLORIDE 30/500 ML
250 PLASTIC BAG, INJECTION (ML) INTRAVENOUS CONTINUOUS
Status: ACTIVE | OUTPATIENT
Start: 2023-11-09 | End: 2023-11-10

## 2023-11-09 RX ORDER — SODIUM CHLORIDE 0.9 % (FLUSH) 0.9 %
10 SYRINGE (ML) INJECTION AS NEEDED
Status: DISCONTINUED | OUTPATIENT
Start: 2023-11-09 | End: 2023-11-10 | Stop reason: HOSPADM

## 2023-11-09 RX ORDER — ACETAMINOPHEN 325 MG/1
650 TABLET ORAL EVERY 4 HOURS PRN
Status: DISCONTINUED | OUTPATIENT
Start: 2023-11-09 | End: 2023-11-10 | Stop reason: HOSPADM

## 2023-11-09 RX ORDER — SODIUM CHLORIDE 9 MG/ML
40 INJECTION, SOLUTION INTRAVENOUS AS NEEDED
Status: DISCONTINUED | OUTPATIENT
Start: 2023-11-09 | End: 2023-11-10 | Stop reason: HOSPADM

## 2023-11-09 RX ORDER — MORPHINE SULFATE 2 MG/ML
2 INJECTION, SOLUTION INTRAMUSCULAR; INTRAVENOUS
Status: DISCONTINUED | OUTPATIENT
Start: 2023-11-09 | End: 2023-11-10 | Stop reason: HOSPADM

## 2023-11-09 RX ORDER — ONDANSETRON 4 MG/1
4 TABLET, FILM COATED ORAL EVERY 6 HOURS PRN
Status: DISCONTINUED | OUTPATIENT
Start: 2023-11-09 | End: 2023-11-10 | Stop reason: HOSPADM

## 2023-11-09 RX ADMIN — SODIUM CHLORIDE, POTASSIUM CHLORIDE, SODIUM LACTATE AND CALCIUM CHLORIDE 125 ML/HR: 600; 310; 30; 20 INJECTION, SOLUTION INTRAVENOUS at 22:41

## 2023-11-09 NOTE — TELEPHONE ENCOUNTER
"Patient expresses concern for mucus plug; informed her that the \"clear jelly discharge\" is normal. Verbalized understanding.    Pelvic pains described as low and in front with some mild wrapping that began this AM. She states she gets the pains pretty regularly; has to take a deep breath through them. Worse with standing. Reports she's staying well hydrated and has been resting today, with no alleviation in symptoms. Described pelvic pressure to patient after she questioned what it was, and she reports it is present as well. Patient also states she had 4 bowel movements today, which is not her usual.    Informed MD on call of patient's symptoms; requested patient be triaged in L&D. Informed patient.  "

## 2023-11-09 NOTE — TELEPHONE ENCOUNTER
37w3d, Lower pelvic pain, No pressure, clear jelly discharge, no bleeding . Baby is moving a lot more than usual.

## 2023-11-10 VITALS
DIASTOLIC BLOOD PRESSURE: 59 MMHG | HEART RATE: 90 BPM | TEMPERATURE: 97.9 F | SYSTOLIC BLOOD PRESSURE: 114 MMHG | RESPIRATION RATE: 16 BRPM

## 2023-11-10 PROBLEM — Z34.93 THIRD TRIMESTER PREGNANCY: Status: ACTIVE | Noted: 2023-11-10

## 2023-11-10 PROCEDURE — 59025 FETAL NON-STRESS TEST: CPT

## 2023-11-10 NOTE — DISCHARGE SUMMARY
Admission date: 2023  Discharge date: 11/10/23    Referring Provider: Woodrow Agee MD    Admission diagnosis:  Normal labor [O80, Z37.9]  Third trimester pregnancy [Z34.93]      Normal labor    Third trimester pregnancy       Discharge diagnosis:1.  22-year-old -0-0-1 at 37 weeks 4 days gestation                                      2.  False labor                                      3.  BS negative    Consultants:      Hospital course:  Patient 22-year-old -0-0-1 at 37 weeks 3 days, who presents with complaints of regular uterine activity and lost her mucous plug.  Patient initially had regular urine activity cervical exam 2 cm.  Patient was admitted, labs obtained, observe throughout the night.  Tractions ceased with no cervical change noted.  Internal and fetal wellbeing established.  Patient discharged home with labor instructions.      Vitals:    11/10/23 0807   BP: 114/59   Pulse: 90   Resp: 16   Temp: 97.9 °F (36.6 °C)       GENERAL:  Well-developed, well-nourished in no acute distress.   ABD:  Gravid  NST: Reactive  SVE: 2 cm 60%, -3 vertex  FHT's  EXTREMITIES:  No clubbing, cyanosis or edema.  PSYCHIATRIC:  Normal affect and mood.      Discharge condition: stable  Discharge diet   Dietary Orders (From admission, onward)       Start     Ordered    23  Diet: Liquid Diets; Clear Liquid; Fluid Consistency: Thin (IDDSI 0)  Diet Effective Now        References:    Diet Order Crosswalk   Question Answer Comment   Diets: Liquid Diets    Liquid Diet: Clear Liquid    Fluid Consistency: Thin (IDDSI 0)        23                  Additional Instructions: Call with fevers, uncontrolled nausea/vomiting/pain.  Medications:      Discharge Medications        Continue These Medications        Instructions Start Date   aspirin 81 MG chewable tablet   81 mg, Oral, Daily      cholecalciferol 25 MCG (1000 UT) tablet  Commonly known as: VITAMIN D3   1,000 Units, Oral, Daily       PRENATAL 1 PO   Oral             Disposition:Home or Self Care  Follow up:   Future Appointments   Date Time Provider Department Center   11/13/2023  2:10 PM Woodrow Agee MD [facility]   11/20/2023  5:00 PM [facility] LD INDUCTION ROOM 2 [facility]       I spent over 30 minutes on discharge activity which included: face-to-face encounter counseling with the patient, reviewing the data in the system, coordination of the care with the nursing staff as well as consultants, documentation, and entering orders.      Estrada Escalante, DO  09:30 EST

## 2023-11-10 NOTE — H&P
"[facility]  Obstetric History and Physical    Chief Complaint   Patient presents with    Contractions       Subjective     Patient is a 22 y.o. female  currently at 37w3d, who presents with complaints of contractions beginning earlier this afternoon.  She states she has passed her mucous plug but denies vaginal bleeding or leakage of fluid.  Active fetal movement reported.  She had a recent prenatal visit on  and was told that her cervix is 2 cm dilated.    Her prenatal care is reportedly benign. Her previous obstetric/gynecological history is notable for 1 prior term vaginal delivery.    The following portions of the patients history were reviewed and updated as appropriate: current medications, allergies, past medical history, past surgical history, past family history, past social history, and problem list .        Prenatal Information:   Maternal Prenatal Labs  Blood Type No results found for: \"ABO\"   Rh Status No results found for: \"RH\"   Antibody Screen No results found for: \"ABSCRN\"   Gonnorhea No results found for: \"GCCX\"   Chlamydia No results found for: \"CLAMYDCU\"   RPR No results found for: \"RPR\"   Syphilis Antibody No results found for: \"SYPHILIS\"   Rubella No results found for: \"RUBELLAIGGIN\"   Hepatitis B Surface Antigen No results found for: \"HEPBSAG\"   HIV-1 Antibody No results found for: \"LABHIV1\"   Hepatitis C Antibody No results found for: \"HEPCAB\"   Rapid Urin Drug Screen No results found for: \"AMPMETHU\", \"BARBITSCNUR\", \"LABBENZSCN\", \"LABMETHSCN\", \"LABOPIASCN\", \"THCURSCR\", \"COCAINEUR\", \"AMPHETSCREEN\", \"PROPOXSCN\", \"BUPRENORSCNU\", \"METAMPSCNUR\", \"OXYCODONESCN\", \"TRICYCLICSCN\"   Group B Strep Culture No results found for: \"GBSANTIGEN\"           External Prenatal Results       Pregnancy Outside Results - Transcribed From Office Records - See Scanned Records For Details       Test Value Date Time    ABO  O  23 1506    Rh  Positive  23 1506    Antibody Screen  Negative  " 23 1405       Negative  23 1506    Varicella IgG       Rubella  1.43 index 23 1506    Hgb  12.9 g/dL 23 1405       13.4 g/dL 23        14.0 g/dL 23 1506    Hct  37.4 % 23 1405       40.1 % 23        41.2 % 23 1506    Glucose Fasting GTT       Glucose Tolerance Test 1 hour       Glucose Tolerance Test 3 hour       Gonorrhea (discrete)  Negative  23 1506    Chlamydia (discrete)  Negative  23 1506    RPR  Non Reactive  23 1506    VDRL       Syphilis Antibody       HBsAg  Negative  23 1506    Herpes Simplex Virus PCR       Herpes Simplex VIrus Culture       HIV  Non Reactive  23 1506    Hep C RNA Quant PCR       Hep C Antibody  Non Reactive  23 1506    AFP  21.0 ng/mL 23 1407    Group B Strep  Negative  10/30/23 1600    GBS Susceptibility to Clindamycin       GBS Susceptibility to Erythromycin       Fetal Fibronectin       Genetic Testing, Maternal Blood                 Drug Screening       Test Value Date Time    Urine Drug Screen       Amphetamine Screen  Negative ng/mL 23 1506    Barbiturate Screen  Negative ng/mL 23 1506    Benzodiazepine Screen  Negative ng/mL 23 1506    Methadone Screen  Negative ng/mL 23 1506    Phencyclidine Screen  Negative ng/mL 23 1506    Opiates Screen       THC Screen       Cocaine Screen       Propoxyphene Screen  Negative ng/mL 23 1506    Buprenorphine Screen       Methamphetamine Screen       Oxycodone Screen       Tricyclic Antidepressants Screen                 Legend    ^: Historical                              Past OB History:       OB History    Para Term  AB Living   2 1 1 0 0 1   SAB IAB Ectopic Molar Multiple Live Births   0 0 0 0 0 1      # Outcome Date GA Lbr Meet/2nd Weight Sex Delivery Anes PTL Lv   2 Current            1 Term 22 37w0d  3402 g (7 lb 8 oz) M Vag-Vacuum EPI  BRIAN      Complications: Fourth degree laceration of  perineum during delivery, postpartum      Obstetric Comments   Fob #1 - Pregnancy #1 and #2    Pregnancy #1 - 4th degree laceration with vacuum assist/ possible planning p c/s if needed        Past Medical History:  Past Medical History:   Diagnosis Date    Endometriosis     History of gestational hypertension 2022    PCOS (polycystic ovarian syndrome)       Past Surgical History Past Surgical History:   Procedure Laterality Date    CHOLECYSTECTOMY      LAPAROSCOPY HYSTEROSCOPY ENDOMETRIAL ABLATION      TONSILLECTOMY        Family History: Family History   Problem Relation Age of Onset    Uterine cancer Mother         cervical cancer    Breast cancer Neg Hx     Ovarian cancer Neg Hx     Colon cancer Neg Hx       Social History:  reports that she has never smoked. She has never used smokeless tobacco.   reports no history of alcohol use.   reports no history of drug use.   Allergies: Amoxicillin-pot clavulanate, Sulfa antibiotics, and Sulfamethoxazole-trimethoprim  Current Medications:          No current facility-administered medications on file prior to encounter.     Current Outpatient Medications on File Prior to Encounter   Medication Sig Dispense Refill    aspirin 81 MG chewable tablet Chew 1 tablet Daily.      cholecalciferol (VITAMIN D3) 25 MCG (1000 UT) tablet Take 1 tablet by mouth Daily.      Prenatal MV-Min-Fe Fum-FA-DHA (PRENATAL 1 PO) Take  by mouth.         General ROS: Pertinent items are noted in HPI, all other systems reviewed and negative    Objective       Vital Signs Range for the last 24 hours  Temperature:     Temp Source:     BP:     Pulse:     Respirations:     SPO2:     O2 Amount (l/min):     O2 Devices     Weight:       Physical Examination: General appearance - alert, well appearing, and in no distress, oriented to person, place, and time, and overweight  Mental status - alert, oriented to person, place, and time, normal mood, behavior, speech, dress, motor activity, and thought  "processes  Eyes - pupils equal and reactive, extraocular eye movements intact, sclera anicteric  Neck - supple, no significant adenopathy, thyroid exam: thyroid is normal in size without nodules or tenderness  Chest - clear to auscultation, no wheezes, rales or rhonchi, symmetric air entry  Heart - normal rate, regular rhythm, normal S1, S2, no murmurs, rubs, clicks or gallops  Abdomen-soft, nontender, nondistended, no masses or organomegaly   Abdomen, Non-Tender  Pelvic - VULVA: normal appearing vulva with no masses, tenderness or lesions, CERVIX: 2 cm / 50%/-2 station.  Neurological - alert, oriented, normal speech, no focal findings or movement disorder noted, DTR's normal and symmetric  Extremities - no pedal edema noted    Fetal Heart Rate Assessment  Indication: False labor versus latent labor   Start Time:                 end Time:    NST Results: Reactive NST.  Fetal heart rate baseline 130 bpm.  Moderate variability with 15 x 15 accelerations noted.  No decelerations.  Contractions noted every 3-5 minutes.        Laboratory Results:   Lab Results   Component Value Date    ALT 11 2023    AST 13 2023    CREATININE 0.55 (L) 2023     2023    URICACID 4.1 2023       No results found for: \"WBC\", \"RBC\", \"HGB\", \"HCT\", \"MCV\", \"MCH\", \"MCHC\", \"RDW\", \"RDWSD\", \"MPV\", \"PLT\", \"NEUTRORELPCT\", \"LYMPHORELPCT\", \"MONORELPCT\", \"EOSRELPCT\", \"BASORELPCT\", \"AUTOIGPER\", \"NEUTROABS\", \"LYMPHSABS\", \"MONOSABS\", \"EOSABS\", \"BASOSABS\", \"AUTOIGNUM\", \"NRBC\"          Brief Urine Lab Results  (Last result in the past 365 days)        Color   Clarity   Blood   Leuk Est   Nitrite   Protein   CREAT   Urine HCG        23 1404           Negative                     Radiology Review: No new studies  Other Studies: None    Assessment & Plan       * No active hospital problems. *        Assessment:  False versus latent labor.    Plan:  Monitor and observe for progression of labor.  We will " "reexamine in 1-2 hours to see if there is any cervical change.     Total time spent today with Heather  was 20-29 minutes (level 3).  Of this time, > 50% was spent face-to-face time coordinating care, answering her questions and counseling regarding pathophysiology of her presenting problem along with plans for any diagnostic work-up and treatment.        Tony Tam \"Glenny\" MELIZA Sosa MD  11/9/2023  19:32 EST    "

## 2023-11-13 ENCOUNTER — ROUTINE PRENATAL (OUTPATIENT)
Dept: OBSTETRICS AND GYNECOLOGY | Facility: CLINIC | Age: 23
End: 2023-11-13
Payer: COMMERCIAL

## 2023-11-13 VITALS — SYSTOLIC BLOOD PRESSURE: 120 MMHG | DIASTOLIC BLOOD PRESSURE: 80 MMHG | BODY MASS INDEX: 40.97 KG/M2 | WEIGHT: 250 LBS

## 2023-11-13 DIAGNOSIS — Z3A.38 38 WEEKS GESTATION OF PREGNANCY: Primary | ICD-10-CM

## 2023-11-13 LAB
EXPIRATION DATE: 1
GLUCOSE UR STRIP-MCNC: NEGATIVE MG/DL
Lab: 1
PROT UR STRIP-MCNC: NEGATIVE MG/DL

## 2023-11-13 NOTE — PROGRESS NOTES
OB FOLLOW UP  CC- Here for care of pregnancy        Heather Garcia is a 22 y.o.  38w0d patient being seen today for her obstetrical follow up visit. Patient reports irregular contractions .     Her prenatal care is complicated by (and status) : None  Patient Active Problem List   Diagnosis    Supervision of other normal pregnancy, antepartum    Maternal obesity affecting pregnancy, antepartum    Normal labor    Third trimester pregnancy       GBS Status:   Strep Gp B Culture   Date Value Ref Range Status   10/30/2023 Negative Negative Final     Comment:     Centers for Disease Control and Prevention (CDC) and American Congress  of Obstetricians and Gynecologists (ACOG) guidelines for prevention of   group B streptococcal (GBS) disease specify co-collection of  a vaginal and rectal swab specimen to maximize sensitivity of GBS  detection. Per the CDC and ACOG, swabbing both the lower vagina and  rectum substantially increases the yield of detection compared with  sampling the vagina alone.  Penicillin G, ampicillin, or cefazolin are indicated for intrapartum  prophylaxis of  GBS colonization. Reflex susceptibility  testing should be performed prior to use of clindamycin only on GBS  isolates from penicillin-allergic women who are considered a high risk  for anaphylaxis. Treatment with vancomycin without additional testing  is warranted if resistance to clindamycin is noted.           Allergies   Allergen Reactions    Amoxicillin-Pot Clavulanate Anaphylaxis    Sulfa Antibiotics Rash    Sulfamethoxazole-Trimethoprim Rash          Flu Status: Already given in current flu season  Her Delivery Plan is: [facility]ires IOL at 39wks. Scheduled    US today: no  Non Stress Test: No.    ROS -   Patient Reports : Contractions  Patient Denies: Loss of Fluid, Vaginal Spotting, Vision Changes, Headaches, Nausea , and Vomiting   Fetal Movement : normal  All other systems reviewed and are negative.       The  additional following portions of the patient's history were reviewed and updated as appropriate: allergies and current medications.    I have reviewed and agree with the HPI, ROS, and historical information as entered above. Woodrow Agee MD        EXAM:     Prenatal Vitals  BP: 120/80  Weight: 113 kg (250 lb)                  Urine Glucose Read-only: Negative  Urine Protein Read-only: Negative           Assessment and Plan    Problem List Items Addressed This Visit    None  Visit Diagnoses       38 weeks gestation of pregnancy    -  Primary    Relevant Orders    POC Glucose, Urine, Qualitative, Dipstick (Completed)    POC Protein, Urine, Qualitative, Dipstick (Completed)            Pregnancy at 38w0d  Fetal status reassuring.   Reviewed Pre-eclampsia signs/symptoms  Reviewed upcoming IOL with patient. Instructions given.  Delivery options reviewed with patient  Signs of labor reviewed  Kick counts reviewed  Activity and Exercise discussed.  Return for No follow up needed. Being induced next Monday.    Woodrow Agee MD  11/13/2023

## 2023-11-20 ENCOUNTER — PREP FOR SURGERY (OUTPATIENT)
Dept: OTHER | Facility: HOSPITAL | Age: 23
End: 2023-11-20
Payer: COMMERCIAL

## 2023-11-20 ENCOUNTER — HOSPITAL ENCOUNTER (OUTPATIENT)
Dept: LABOR AND DELIVERY | Facility: HOSPITAL | Age: 23
Discharge: HOME | End: 2023-11-20
Payer: COMMERCIAL

## 2023-11-20 ENCOUNTER — HOSPITAL ENCOUNTER (INPATIENT)
Facility: HOSPITAL | Age: 23
LOS: 3 days | Discharge: HOME | End: 2023-11-23
Attending: OBSTETRICS & GYNECOLOGY | Admitting: OBSTETRICS & GYNECOLOGY
Payer: COMMERCIAL

## 2023-11-20 DIAGNOSIS — Z34.80 SUPERVISION OF OTHER NORMAL PREGNANCY, ANTEPARTUM: ICD-10-CM

## 2023-11-20 PROBLEM — Z34.90 PREGNANCY: Status: ACTIVE | Noted: 2023-11-20

## 2023-11-20 LAB
ABO GROUP BLD: NORMAL
BLD GP AB SCN SERPL QL: NEGATIVE
DEPRECATED RDW RBC AUTO: 39.3 FL (ref 37–54)
ERYTHROCYTE [DISTWIDTH] IN BLOOD BY AUTOMATED COUNT: 12.9 % (ref 12.3–15.4)
HCT VFR BLD AUTO: 37 % (ref 34–46.6)
HGB BLD-MCNC: 12 G/DL (ref 12–15.9)
MCH RBC QN AUTO: 27.3 PG (ref 26.6–33)
MCHC RBC AUTO-ENTMCNC: 32.4 G/DL (ref 31.5–35.7)
MCV RBC AUTO: 84.3 FL (ref 79–97)
PLATELET # BLD AUTO: 251 10*3/MM3 (ref 140–450)
PMV BLD AUTO: 9.4 FL (ref 6–12)
RBC # BLD AUTO: 4.39 10*6/MM3 (ref 3.77–5.28)
RH BLD: POSITIVE
T&S EXPIRATION DATE: NORMAL
WBC NRBC COR # BLD AUTO: 9.81 10*3/MM3 (ref 3.4–10.8)

## 2023-11-20 PROCEDURE — 86900 BLOOD TYPING SEROLOGIC ABO: CPT | Performed by: OBSTETRICS & GYNECOLOGY

## 2023-11-20 PROCEDURE — 85027 COMPLETE CBC AUTOMATED: CPT | Performed by: OBSTETRICS & GYNECOLOGY

## 2023-11-20 PROCEDURE — 86850 RBC ANTIBODY SCREEN: CPT | Performed by: OBSTETRICS & GYNECOLOGY

## 2023-11-20 PROCEDURE — 25810000003 LACTATED RINGERS PER 1000 ML: Performed by: OBSTETRICS & GYNECOLOGY

## 2023-11-20 PROCEDURE — 86901 BLOOD TYPING SEROLOGIC RH(D): CPT | Performed by: OBSTETRICS & GYNECOLOGY

## 2023-11-20 PROCEDURE — 59025 FETAL NON-STRESS TEST: CPT

## 2023-11-20 RX ORDER — SODIUM CHLORIDE 0.9 % (FLUSH) 0.9 %
10 SYRINGE (ML) INJECTION AS NEEDED
Status: DISCONTINUED | OUTPATIENT
Start: 2023-11-20 | End: 2023-11-21 | Stop reason: HOSPADM

## 2023-11-20 RX ORDER — CITRIC ACID/SODIUM CITRATE 334-500MG
30 SOLUTION, ORAL ORAL ONCE AS NEEDED
Status: DISCONTINUED | OUTPATIENT
Start: 2023-11-20 | End: 2023-11-21 | Stop reason: HOSPADM

## 2023-11-20 RX ORDER — TERBUTALINE SULFATE 1 MG/ML
0.25 INJECTION, SOLUTION SUBCUTANEOUS AS NEEDED
Status: DISCONTINUED | OUTPATIENT
Start: 2023-11-20 | End: 2023-11-21 | Stop reason: HOSPADM

## 2023-11-20 RX ORDER — PROMETHAZINE HYDROCHLORIDE 12.5 MG/1
12.5 TABLET ORAL EVERY 6 HOURS PRN
Status: DISCONTINUED | OUTPATIENT
Start: 2023-11-20 | End: 2023-11-21 | Stop reason: HOSPADM

## 2023-11-20 RX ORDER — NALOXONE HCL 0.4 MG/ML
0.4 VIAL (ML) INJECTION
Status: DISCONTINUED | OUTPATIENT
Start: 2023-11-20 | End: 2023-11-21

## 2023-11-20 RX ORDER — MORPHINE SULFATE 2 MG/ML
1 INJECTION, SOLUTION INTRAMUSCULAR; INTRAVENOUS EVERY 4 HOURS PRN
Status: DISCONTINUED | OUTPATIENT
Start: 2023-11-20 | End: 2023-11-20 | Stop reason: SDUPTHER

## 2023-11-20 RX ORDER — NALOXONE HCL 0.4 MG/ML
0.4 VIAL (ML) INJECTION
Status: DISCONTINUED | OUTPATIENT
Start: 2023-11-20 | End: 2023-11-20 | Stop reason: SDUPTHER

## 2023-11-20 RX ORDER — ONDANSETRON 2 MG/ML
4 INJECTION INTRAMUSCULAR; INTRAVENOUS EVERY 6 HOURS PRN
Status: DISCONTINUED | OUTPATIENT
Start: 2023-11-20 | End: 2023-11-21 | Stop reason: HOSPADM

## 2023-11-20 RX ORDER — LIDOCAINE HYDROCHLORIDE 10 MG/ML
0.5 INJECTION, SOLUTION EPIDURAL; INFILTRATION; INTRACAUDAL; PERINEURAL ONCE AS NEEDED
Status: DISCONTINUED | OUTPATIENT
Start: 2023-11-20 | End: 2023-11-21 | Stop reason: HOSPADM

## 2023-11-20 RX ORDER — PROMETHAZINE HYDROCHLORIDE 12.5 MG/1
12.5 SUPPOSITORY RECTAL EVERY 6 HOURS PRN
Status: DISCONTINUED | OUTPATIENT
Start: 2023-11-20 | End: 2023-11-21 | Stop reason: HOSPADM

## 2023-11-20 RX ORDER — SODIUM CHLORIDE 9 MG/ML
40 INJECTION, SOLUTION INTRAVENOUS AS NEEDED
Status: DISCONTINUED | OUTPATIENT
Start: 2023-11-20 | End: 2023-11-21 | Stop reason: HOSPADM

## 2023-11-20 RX ORDER — SODIUM CHLORIDE, SODIUM LACTATE, POTASSIUM CHLORIDE, CALCIUM CHLORIDE 600; 310; 30; 20 MG/100ML; MG/100ML; MG/100ML; MG/100ML
125 INJECTION, SOLUTION INTRAVENOUS CONTINUOUS
Status: DISCONTINUED | OUTPATIENT
Start: 2023-11-20 | End: 2023-11-23 | Stop reason: HOSPADM

## 2023-11-20 RX ORDER — ONDANSETRON 4 MG/1
4 TABLET, FILM COATED ORAL EVERY 6 HOURS PRN
Status: DISCONTINUED | OUTPATIENT
Start: 2023-11-20 | End: 2023-11-21 | Stop reason: HOSPADM

## 2023-11-20 RX ORDER — MAGNESIUM CARB/ALUMINUM HYDROX 105-160MG
30 TABLET,CHEWABLE ORAL ONCE
Status: DISCONTINUED | OUTPATIENT
Start: 2023-11-20 | End: 2023-11-21 | Stop reason: HOSPADM

## 2023-11-20 RX ORDER — OXYTOCIN/0.9 % SODIUM CHLORIDE 30/500 ML
2-20 PLASTIC BAG, INJECTION (ML) INTRAVENOUS
Status: DISCONTINUED | OUTPATIENT
Start: 2023-11-20 | End: 2023-11-21 | Stop reason: HOSPADM

## 2023-11-20 RX ORDER — ACETAMINOPHEN 325 MG/1
650 TABLET ORAL EVERY 4 HOURS PRN
Status: DISCONTINUED | OUTPATIENT
Start: 2023-11-20 | End: 2023-11-21 | Stop reason: HOSPADM

## 2023-11-20 RX ORDER — MORPHINE SULFATE 2 MG/ML
1 INJECTION, SOLUTION INTRAMUSCULAR; INTRAVENOUS EVERY 4 HOURS PRN
Status: DISCONTINUED | OUTPATIENT
Start: 2023-11-20 | End: 2023-11-21

## 2023-11-20 RX ORDER — SODIUM CHLORIDE 0.9 % (FLUSH) 0.9 %
10 SYRINGE (ML) INJECTION EVERY 12 HOURS SCHEDULED
Status: DISCONTINUED | OUTPATIENT
Start: 2023-11-20 | End: 2023-11-21 | Stop reason: HOSPADM

## 2023-11-20 RX ADMIN — SODIUM CHLORIDE, POTASSIUM CHLORIDE, SODIUM LACTATE AND CALCIUM CHLORIDE 125 ML/HR: 600; 310; 30; 20 INJECTION, SOLUTION INTRAVENOUS at 19:55

## 2023-11-20 RX ADMIN — Medication 2 MILLI-UNITS/MIN: at 20:58

## 2023-11-21 ENCOUNTER — ANESTHESIA (OUTPATIENT)
Dept: LABOR AND DELIVERY | Facility: HOSPITAL | Age: 23
End: 2023-11-21
Payer: COMMERCIAL

## 2023-11-21 ENCOUNTER — ANESTHESIA EVENT (OUTPATIENT)
Dept: LABOR AND DELIVERY | Facility: HOSPITAL | Age: 23
End: 2023-11-21
Payer: COMMERCIAL

## 2023-11-21 PROCEDURE — 25010000002 ROPIVACAINE PER 1 MG: Performed by: NURSE ANESTHETIST, CERTIFIED REGISTERED

## 2023-11-21 PROCEDURE — 25010000002 MORPHINE PER 10 MG: Performed by: OBSTETRICS & GYNECOLOGY

## 2023-11-21 PROCEDURE — C1755 CATHETER, INTRASPINAL: HCPCS | Performed by: ANESTHESIOLOGY

## 2023-11-21 PROCEDURE — 25810000003 LACTATED RINGERS PER 1000 ML: Performed by: OBSTETRICS & GYNECOLOGY

## 2023-11-21 PROCEDURE — 0HQ9XZZ REPAIR PERINEUM SKIN, EXTERNAL APPROACH: ICD-10-PCS | Performed by: OBSTETRICS & GYNECOLOGY

## 2023-11-21 PROCEDURE — C1755 CATHETER, INTRASPINAL: HCPCS

## 2023-11-21 PROCEDURE — 25010000002 FENTANYL CITRATE (PF) 50 MCG/ML SOLUTION: Performed by: NURSE ANESTHETIST, CERTIFIED REGISTERED

## 2023-11-21 PROCEDURE — 51703 INSERT BLADDER CATH COMPLEX: CPT

## 2023-11-21 PROCEDURE — 59410 OBSTETRICAL CARE: CPT | Performed by: OBSTETRICS & GYNECOLOGY

## 2023-11-21 RX ORDER — ROPIVACAINE HYDROCHLORIDE 2 MG/ML
15 INJECTION, SOLUTION EPIDURAL; INFILTRATION; PERINEURAL CONTINUOUS
Status: DISCONTINUED | OUTPATIENT
Start: 2023-11-21 | End: 2023-11-23 | Stop reason: HOSPADM

## 2023-11-21 RX ORDER — ONDANSETRON 2 MG/ML
4 INJECTION INTRAMUSCULAR; INTRAVENOUS EVERY 6 HOURS PRN
Status: DISCONTINUED | OUTPATIENT
Start: 2023-11-21 | End: 2023-11-21 | Stop reason: HOSPADM

## 2023-11-21 RX ORDER — HYDROCODONE BITARTRATE AND ACETAMINOPHEN 5; 325 MG/1; MG/1
2 TABLET ORAL EVERY 6 HOURS PRN
Status: DISCONTINUED | OUTPATIENT
Start: 2023-11-21 | End: 2023-11-23 | Stop reason: HOSPADM

## 2023-11-21 RX ORDER — HYDROCODONE BITARTRATE AND ACETAMINOPHEN 5; 325 MG/1; MG/1
1 TABLET ORAL EVERY 4 HOURS PRN
Status: DISCONTINUED | OUTPATIENT
Start: 2023-11-21 | End: 2023-11-21 | Stop reason: HOSPADM

## 2023-11-21 RX ORDER — BISACODYL 10 MG
10 SUPPOSITORY, RECTAL RECTAL DAILY PRN
Status: DISCONTINUED | OUTPATIENT
Start: 2023-11-22 | End: 2023-11-23 | Stop reason: HOSPADM

## 2023-11-21 RX ORDER — IBUPROFEN 600 MG/1
600 TABLET ORAL EVERY 6 HOURS PRN
Status: DISCONTINUED | OUTPATIENT
Start: 2023-11-21 | End: 2023-11-21 | Stop reason: HOSPADM

## 2023-11-21 RX ORDER — METOCLOPRAMIDE HYDROCHLORIDE 5 MG/ML
10 INJECTION INTRAMUSCULAR; INTRAVENOUS ONCE AS NEEDED
Status: DISCONTINUED | OUTPATIENT
Start: 2023-11-21 | End: 2023-11-21 | Stop reason: HOSPADM

## 2023-11-21 RX ORDER — HYDROCODONE BITARTRATE AND ACETAMINOPHEN 5; 325 MG/1; MG/1
1 TABLET ORAL EVERY 4 HOURS PRN
Status: DISCONTINUED | OUTPATIENT
Start: 2023-11-21 | End: 2023-11-23 | Stop reason: HOSPADM

## 2023-11-21 RX ORDER — PROMETHAZINE HYDROCHLORIDE 12.5 MG/1
12.5 SUPPOSITORY RECTAL ONCE AS NEEDED
Status: DISCONTINUED | OUTPATIENT
Start: 2023-11-21 | End: 2023-11-23 | Stop reason: HOSPADM

## 2023-11-21 RX ORDER — EPHEDRINE SULFATE 5 MG/ML
10 INJECTION INTRAVENOUS
Status: DISCONTINUED | OUTPATIENT
Start: 2023-11-21 | End: 2023-11-21 | Stop reason: HOSPADM

## 2023-11-21 RX ORDER — MORPHINE SULFATE 2 MG/ML
2 INJECTION, SOLUTION INTRAMUSCULAR; INTRAVENOUS
Status: DISCONTINUED | OUTPATIENT
Start: 2023-11-21 | End: 2023-11-21 | Stop reason: HOSPADM

## 2023-11-21 RX ORDER — FENTANYL CITRATE 50 UG/ML
INJECTION, SOLUTION INTRAMUSCULAR; INTRAVENOUS AS NEEDED
Status: DISCONTINUED | OUTPATIENT
Start: 2023-11-21 | End: 2023-11-21 | Stop reason: SURG

## 2023-11-21 RX ORDER — METHYLERGONOVINE MALEATE 0.2 MG/ML
200 INJECTION INTRAVENOUS ONCE AS NEEDED
Status: DISCONTINUED | OUTPATIENT
Start: 2023-11-21 | End: 2023-11-21 | Stop reason: HOSPADM

## 2023-11-21 RX ORDER — PROMETHAZINE HYDROCHLORIDE 12.5 MG/1
12.5 SUPPOSITORY RECTAL EVERY 6 HOURS PRN
Status: DISCONTINUED | OUTPATIENT
Start: 2023-11-21 | End: 2023-11-21 | Stop reason: HOSPADM

## 2023-11-21 RX ORDER — MISOPROSTOL 200 UG/1
800 TABLET ORAL AS NEEDED
Status: DISCONTINUED | OUTPATIENT
Start: 2023-11-21 | End: 2023-11-21 | Stop reason: HOSPADM

## 2023-11-21 RX ORDER — OXYTOCIN/0.9 % SODIUM CHLORIDE 30/500 ML
999 PLASTIC BAG, INJECTION (ML) INTRAVENOUS ONCE
Status: DISCONTINUED | OUTPATIENT
Start: 2023-11-21 | End: 2023-11-23 | Stop reason: HOSPADM

## 2023-11-21 RX ORDER — PROMETHAZINE HYDROCHLORIDE 25 MG/1
25 TABLET ORAL ONCE AS NEEDED
Status: DISCONTINUED | OUTPATIENT
Start: 2023-11-21 | End: 2023-11-23 | Stop reason: HOSPADM

## 2023-11-21 RX ORDER — ACETAMINOPHEN 325 MG/1
650 TABLET ORAL EVERY 4 HOURS PRN
Status: DISCONTINUED | OUTPATIENT
Start: 2023-11-21 | End: 2023-11-21 | Stop reason: HOSPADM

## 2023-11-21 RX ORDER — LIDOCAINE HYDROCHLORIDE AND EPINEPHRINE 15; 5 MG/ML; UG/ML
INJECTION, SOLUTION EPIDURAL AS NEEDED
Status: DISCONTINUED | OUTPATIENT
Start: 2023-11-21 | End: 2023-11-21 | Stop reason: SURG

## 2023-11-21 RX ORDER — SODIUM CHLORIDE 0.9 % (FLUSH) 0.9 %
1-10 SYRINGE (ML) INJECTION AS NEEDED
Status: DISCONTINUED | OUTPATIENT
Start: 2023-11-21 | End: 2023-11-23 | Stop reason: HOSPADM

## 2023-11-21 RX ORDER — OXYTOCIN/0.9 % SODIUM CHLORIDE 30/500 ML
125 PLASTIC BAG, INJECTION (ML) INTRAVENOUS CONTINUOUS PRN
Status: DISCONTINUED | OUTPATIENT
Start: 2023-11-21 | End: 2023-11-23 | Stop reason: HOSPADM

## 2023-11-21 RX ORDER — CARBOPROST TROMETHAMINE 250 UG/ML
250 INJECTION, SOLUTION INTRAMUSCULAR AS NEEDED
Status: DISCONTINUED | OUTPATIENT
Start: 2023-11-21 | End: 2023-11-21 | Stop reason: HOSPADM

## 2023-11-21 RX ORDER — HYDROCORTISONE 25 MG/G
1 CREAM TOPICAL AS NEEDED
Status: DISCONTINUED | OUTPATIENT
Start: 2023-11-21 | End: 2023-11-23 | Stop reason: HOSPADM

## 2023-11-21 RX ORDER — OXYTOCIN/0.9 % SODIUM CHLORIDE 30/500 ML
250 PLASTIC BAG, INJECTION (ML) INTRAVENOUS CONTINUOUS
Status: ACTIVE | OUTPATIENT
Start: 2023-11-21 | End: 2023-11-21

## 2023-11-21 RX ORDER — IBUPROFEN 600 MG/1
600 TABLET ORAL EVERY 6 HOURS PRN
Status: DISCONTINUED | OUTPATIENT
Start: 2023-11-21 | End: 2023-11-23 | Stop reason: HOSPADM

## 2023-11-21 RX ORDER — ONDANSETRON 4 MG/1
4 TABLET, FILM COATED ORAL EVERY 6 HOURS PRN
Status: DISCONTINUED | OUTPATIENT
Start: 2023-11-21 | End: 2023-11-21 | Stop reason: HOSPADM

## 2023-11-21 RX ORDER — DIPHENHYDRAMINE HYDROCHLORIDE 50 MG/ML
12.5 INJECTION INTRAMUSCULAR; INTRAVENOUS EVERY 8 HOURS PRN
Status: DISCONTINUED | OUTPATIENT
Start: 2023-11-21 | End: 2023-11-21 | Stop reason: HOSPADM

## 2023-11-21 RX ORDER — ACETAMINOPHEN 325 MG/1
650 TABLET ORAL EVERY 6 HOURS PRN
Status: DISCONTINUED | OUTPATIENT
Start: 2023-11-21 | End: 2023-11-23 | Stop reason: HOSPADM

## 2023-11-21 RX ORDER — METOCLOPRAMIDE 10 MG/1
10 TABLET ORAL ONCE AS NEEDED
Status: DISCONTINUED | OUTPATIENT
Start: 2023-11-21 | End: 2023-11-23 | Stop reason: HOSPADM

## 2023-11-21 RX ORDER — FAMOTIDINE 10 MG/ML
20 INJECTION, SOLUTION INTRAVENOUS ONCE AS NEEDED
Status: DISCONTINUED | OUTPATIENT
Start: 2023-11-21 | End: 2023-11-21 | Stop reason: HOSPADM

## 2023-11-21 RX ORDER — ONDANSETRON 2 MG/ML
4 INJECTION INTRAMUSCULAR; INTRAVENOUS ONCE AS NEEDED
Status: DISCONTINUED | OUTPATIENT
Start: 2023-11-21 | End: 2023-11-21 | Stop reason: HOSPADM

## 2023-11-21 RX ORDER — DOCUSATE SODIUM 100 MG/1
100 CAPSULE, LIQUID FILLED ORAL 2 TIMES DAILY
Status: DISCONTINUED | OUTPATIENT
Start: 2023-11-21 | End: 2023-11-23 | Stop reason: HOSPADM

## 2023-11-21 RX ORDER — NALOXONE HCL 0.4 MG/ML
0.4 VIAL (ML) INJECTION
Status: DISCONTINUED | OUTPATIENT
Start: 2023-11-21 | End: 2023-11-21 | Stop reason: HOSPADM

## 2023-11-21 RX ORDER — PROMETHAZINE HYDROCHLORIDE 12.5 MG/1
12.5 TABLET ORAL EVERY 6 HOURS PRN
Status: DISCONTINUED | OUTPATIENT
Start: 2023-11-21 | End: 2023-11-21 | Stop reason: HOSPADM

## 2023-11-21 RX ORDER — CITRIC ACID/SODIUM CITRATE 334-500MG
30 SOLUTION, ORAL ORAL ONCE
Status: DISCONTINUED | OUTPATIENT
Start: 2023-11-21 | End: 2023-11-21 | Stop reason: HOSPADM

## 2023-11-21 RX ADMIN — LIDOCAINE HYDROCHLORIDE AND EPINEPHRINE 3 ML: 15; 5 INJECTION, SOLUTION EPIDURAL at 08:47

## 2023-11-21 RX ADMIN — Medication: at 17:13

## 2023-11-21 RX ADMIN — SODIUM CHLORIDE, POTASSIUM CHLORIDE, SODIUM LACTATE AND CALCIUM CHLORIDE 125 ML/HR: 600; 310; 30; 20 INJECTION, SOLUTION INTRAVENOUS at 09:16

## 2023-11-21 RX ADMIN — WITCH HAZEL: 500 SOLUTION RECTAL; TOPICAL at 17:13

## 2023-11-21 RX ADMIN — SODIUM CHLORIDE, POTASSIUM CHLORIDE, SODIUM LACTATE AND CALCIUM CHLORIDE 125 ML/HR: 600; 310; 30; 20 INJECTION, SOLUTION INTRAVENOUS at 08:16

## 2023-11-21 RX ADMIN — ROPIVACAINE HYDROCHLORIDE 10 ML: 5 INJECTION, SOLUTION EPIDURAL; INFILTRATION; PERINEURAL at 08:53

## 2023-11-21 RX ADMIN — Medication 1 APPLICATION: at 17:13

## 2023-11-21 RX ADMIN — SODIUM CHLORIDE, POTASSIUM CHLORIDE, SODIUM LACTATE AND CALCIUM CHLORIDE 125 ML/HR: 600; 310; 30; 20 INJECTION, SOLUTION INTRAVENOUS at 08:34

## 2023-11-21 RX ADMIN — SODIUM CHLORIDE, POTASSIUM CHLORIDE, SODIUM LACTATE AND CALCIUM CHLORIDE 125 ML/HR: 600; 310; 30; 20 INJECTION, SOLUTION INTRAVENOUS at 02:06

## 2023-11-21 RX ADMIN — ROPIVACAINE HYDROCHLORIDE 15 ML/HR: 2 INJECTION, SOLUTION EPIDURAL; INFILTRATION at 08:54

## 2023-11-21 RX ADMIN — DOCUSATE SODIUM 100 MG: 100 CAPSULE, LIQUID FILLED ORAL at 19:53

## 2023-11-21 RX ADMIN — FENTANYL CITRATE 100 MCG: 50 INJECTION, SOLUTION INTRAMUSCULAR; INTRAVENOUS at 08:50

## 2023-11-21 RX ADMIN — EPHEDRINE SULFATE 10 MG: 5 INJECTION INTRAVENOUS at 09:49

## 2023-11-21 RX ADMIN — MORPHINE SULFATE 2 MG: 2 INJECTION, SOLUTION INTRAMUSCULAR; INTRAVENOUS at 03:22

## 2023-11-21 RX ADMIN — IBUPROFEN 600 MG: 600 TABLET, FILM COATED ORAL at 19:53

## 2023-11-21 NOTE — L&D DELIVERY NOTE
"[facility]  Vaginal Delivery Note   Review the Delivery Report for details.       Delivery     Delivery: Vaginal, Spontaneous     YOB: 2022    Time of Birth:  Gestational Age 10:38 AM   39w1d     Anesthesia: Epidural     Delivering clinician: Woodrow Agee    Forceps?   No   Vacuum? No    Shoulder dystocia present: No        Delivery narrative:  The patient delivered a viable male infant by  from the NICOLE presentation over intact perineum. Suction performed at delivery and infant placed on maternal chest. Delayed cord clamping performed. Pitocin given IV and placenta delivered with gentle downward traction. A 1st degree perineal laceration repaired with 3.0 vicryl. Good hemostasis noted.     Infant    Findings: male  infant     Infant observations: Weight: 3695 g (8 lb 2.3 oz)   Length: 20  in  Observations/Comments:        Apgars: 8  @ 1 minute /    9  @ 5 minutes   Infant Name:      Placenta, Cord, and Fluid    Placenta delivered  Spontaneous  at   2023 10:41 AM     Cord: 3 vessels  present.   Nuchal Cord?  no   Cord blood obtained: Yes    Cord gases obtained:  No    Cord gas results: Venous:  No results found for: \"PHCVEN\", \"BECVEN\"    Arterial:  No results found for: \"PHCART\", \"BECART\"     Repair    Episiotomy: Not recorded     No    Lacerations: Yes  Laceration Information  Laceration Repaired?   Perineal:  1st  yes   Periurethral:       Labial:       Sulcus:       Vaginal:       Cervical:         Suture used for repair: 3-0 Vicryl  Laceration Length for 3rd or 4th degree lacerations: NA cm   Estimated Blood Loss:               Quantitative Blood Loss:                  Complications  none    Disposition  Mother to Mother Baby/Postpartum  in stable condition currently.  Baby to NBN  in stable condition currently.      Woodrow Agee MD  23  12:00 EST        "

## 2023-11-21 NOTE — ANESTHESIA PROCEDURE NOTES
Labor Epidural      Patient reassessed immediately prior to procedure    Patient location during procedure: floor  Performed By  CRNA/CLARKE: Arlin Cortez CRNA  Preanesthetic Checklist  Completed: patient identified, IV checked, site marked, risks and benefits discussed, surgical consent, monitors and equipment checked, pre-op evaluation and timeout performed  Prep:  Pt Position:sitting  Sterile Tech:cap, gloves, mask and sterile barrier  Prep:DuraPrep  Monitoring:blood pressure monitoring  Epidural Block Procedure:  Approach:midline  Guidance:landmark technique and palpation technique  Location:L3-L4  Needle Type:Tuohy  Needle Gauge:17 G  Loss of Resistance Medium: air  Loss of Resistance: 8cm  Cath Depth at skin:13 cm  Paresthesia: none  Aspiration:negative  Test Dose:negative  Number of Attempts: 1  Post Assessment:  Dressing:occlusive dressing applied and secured with tape  Pt Tolerance:patient tolerated the procedure well with no apparent complications  Complications:no

## 2023-11-21 NOTE — H&P
[place] [place]    Referring Provider: Woodrow Agee MD      Chief Complaint   Patient presents with    Scheduled Induction       Subjective     Patient is a 22 y.o. female  currently at 39w1d, who presents for IOL .    Her prenatal care has been uneventful.  Her previous obstetric/gynecological history is noted for is remarkable for prior vaginal delivery at [facility] .    The following portions of the patients history were reviewed and updated as appropriate: current medications, allergies, past medical history, past surgical history, past family history, past social history, and problem list .       Prenatal Information:   Maternal Prenatal Labs  Blood Type ABO Type   Date Value Ref Range Status   2023 O  Final      Rh Status RH type   Date Value Ref Range Status   2023 Positive  Final      Antibody Screen Antibody Screen   Date Value Ref Range Status   2023 Negative  Final      Gonnorhea No results found for: \"GCCX\"   Chlamydia No results found for: \"CLAMYDCU\"   RPR No results found for: \"RPR\"   Syphilis Antibody No results found for: \"SYPHILIS\"   Rubella No results found for: \"RUBELLAIGGIN\"   Hepatitis B Surface Antigen No results found for: \"HEPBSAG\"   HIV-1 Antibody No results found for: \"LABHIV1\"   Hepatitis C Antibody No results found for: \"HEPCAB\"   Rapid Urin Drug Screen No results found for: \"AMPMETHU\", \"BARBITSCNUR\", \"LABBENZSCN\", \"LABMETHSCN\", \"LABOPIASCN\", \"THCURSCR\", \"COCAINEUR\", \"AMPHETSCREEN\", \"PROPOXSCN\", \"BUPRENORSCNU\", \"METAMPSCNUR\", \"OXYCODONESCN\", \"TRICYCLICSCN\"   Group B Strep Culture No results found for: \"GBSANTIGEN\"           External Prenatal Results       Pregnancy Outside Results - Transcribed From Office Records - See Scanned Records For Details       Test Value Date Time    ABO  O  23    Rh  Positive  23    Antibody Screen  Negative  23       Negative  23       Negative  23 1405 "       Negative  23 1506    Varicella IgG       Rubella  1.43 index 23 1506    Hgb  12.0 g/dL 23       12.3 g/dL 23       12.9 g/dL 23 1405       13.4 g/dL 23        14.0 g/dL 23 1506    Hct  37.0 % 23       37.8 % 23       37.4 % 23 1405       40.1 % 23        41.2 % 23 1506    Glucose Fasting GTT       Glucose Tolerance Test 1 hour       Glucose Tolerance Test 3 hour       Gonorrhea (discrete)  Negative  23 1506    Chlamydia (discrete)  Negative  23 1506    RPR  Non Reactive  23 1506    VDRL       Syphilis Antibody       HBsAg  Negative  23 1506    Herpes Simplex Virus PCR       Herpes Simplex VIrus Culture       HIV  Non Reactive  23 1506    Hep C RNA Quant PCR       Hep C Antibody  Non Reactive  23 1506    AFP  21.0 ng/mL 23 1407    Group B Strep  Negative  10/30/23 1600    GBS Susceptibility to Clindamycin       GBS Susceptibility to Erythromycin       Fetal Fibronectin       Genetic Testing, Maternal Blood                 Drug Screening       Test Value Date Time    Urine Drug Screen       Amphetamine Screen  Negative ng/mL 23 1506    Barbiturate Screen  Negative ng/mL 23 1506    Benzodiazepine Screen  Negative ng/mL 23 1506    Methadone Screen  Negative ng/mL 23 1506    Phencyclidine Screen  Negative ng/mL 23 1506    Opiates Screen       THC Screen       Cocaine Screen       Propoxyphene Screen  Negative ng/mL 23 1506    Buprenorphine Screen       Methamphetamine Screen       Oxycodone Screen       Tricyclic Antidepressants Screen                 Legend    ^: Historical                              Past OB History:       OB History    Para Term  AB Living   2 1 1 0 0 1   SAB IAB Ectopic Molar Multiple Live Births   0 0 0 0 0 1      # Outcome Date GA Lbr Meet/2nd Weight Sex Delivery Anes PTL Lv   2 Current            1 Term  08/08/22 37w0d  3402 g (7 lb 8 oz) M Vag-Vacuum EPI  BRIAN      Complications: Fourth degree laceration of perineum during delivery, postpartum      Obstetric Comments   Fob #1 - Pregnancy #1 and #2    Pregnancy #1 - 4th degree laceration with vacuum assist/ possible planning p c/s if needed        Past Medical History: Past Medical History:   Diagnosis Date    Endometriosis     History of gestational hypertension 2022    PCOS (polycystic ovarian syndrome)       Past Surgical History Past Surgical History:   Procedure Laterality Date    CHOLECYSTECTOMY      LAPAROSCOPY HYSTEROSCOPY ENDOMETRIAL ABLATION      TONSILLECTOMY      WISDOM TOOTH EXTRACTION        Family History: Family History   Problem Relation Age of Onset    Uterine cancer Mother         cervical cancer    Breast cancer Neg Hx     Ovarian cancer Neg Hx     Colon cancer Neg Hx       Social History:  reports that she has never smoked. She has never used smokeless tobacco.   reports no history of alcohol use.   reports no history of drug use.                   General ROS Negative Findings:Headaches, Visual Changes, Epigastric pain, Anorexia, Nausia/Vomiting, ROM, and Vaginal Bleeding    ROS     All other systems have been reviewed and are neg  Objective       Vital Signs Range for the last 24 hours  Temperature: Temp:  [98 °F (36.7 °C)-98.3 °F (36.8 °C)] 98.3 °F (36.8 °C)   Temp Source: Temp src: Oral   BP: BP: ()/(55-63) 133/63   Pulse: Heart Rate:  [73-98] 86   Respirations: Resp:  [16-18] 18   SPO2:     O2 Amount (l/min):     O2 Devices     Weight: Weight:  [112 kg (248 lb)] 112 kg (248 lb)     Physical Examination:   General:   alert, appears stated age, and cooperative   Skin:   normal   HEENT:     Lungs:   clear to auscultation bilaterally   Heart:   regular rate and rhythm, S1, S2 normal, no murmur, click, rub or gallop   Abdomen:  soft, non-tender; bowel sounds normal; no masses,  no organomegaly   Lower Extremities    Pelvis:  Vulva and  vagina appear normal. Bimanual exam reveals normal uterus and adnexa.         Presentation: vertex   Cervix: Exam by: Method: sterile exam per RN   Dilation:     Effacement: Cervical Effacement: 80%   Station:         Fetal Heart Rate Assessment   Method: Fetal HR Assessment Method: external   Beats/min: Fetal HR (beats/min): 120   Baseline: Fetal HR Baseline: normal range   Varibility: Fetal HR Variability: moderate (amplitude range 6 to 25 bpm)   Accels: Fetal HR Accelerations: greater than/equal to 15 bpm, lasting at least 15 seconds   Decels: Fetal HR Decelerations: absent   Tracing Category:       Uterine Assessment   Method: Method: external tocotransducer   Frequency (min): Contraction Frequency (Minutes): 1-3   Ctx Count in 10 min:     Duration:     Intensity: Contraction Intensity: mild by palpation   Intensity by IUPC:     Resting Tone: Uterine Resting Tone: soft by palpation   Resting Tone by IUPC:     [place] Units:       Laboratory Results:   Lab Results (last 24 hours)       Procedure Component Value Units Date/Time    CBC (No Diff) [[id number]]  (Normal) Collected: 11/20/23 1959    Specimen: Blood Updated: 11/20/23 2033     WBC 9.81 10*3/mm3      RBC 4.39 10*6/mm3      Hemoglobin 12.0 g/dL      Hematocrit 37.0 %      MCV 84.3 fL      MCH 27.3 pg      MCHC 32.4 g/dL      RDW 12.9 %      RDW-SD 39.3 fl      MPV 9.4 fL      Platelets 251 10*3/mm3           Radiology Review:   Imaging Results (Last 24 Hours)       ** No results found for the last 24 hours. **          Other Studies:    Assessment & Plan       Pregnancy        Assessment:  1.  Intrauterine pregnancy at 39w1d weeks gestation with reactive fetal status.    2.  GBS negative    Plan:  1. Vaginal anticipated  2. Plan of care has been reviewed with patient.  3.  Risks, benefits of treatment plan have been discussed.  4.  All questions have been answered.  5      Woodrow Agee MD  11/21/2023  08:18 EST

## 2023-11-21 NOTE — PAYOR COMM NOTE
"Heather Garcia Montana (22 y.o. Female)     [employer] ID#[id number]    Delivery Information:  Vaginal Delivery 11/21/23 @ 10:38  Wt 3695 gms  Male  Apgars 8/9    From:Jill Catherine LPN, Utilization Review  Phone #[phone]  Fax #[phone]        Date of Birth   2000    Social Security Number       Address   [address] [place] [address]    Home Phone   [phone]    MRN   [id number]       Muslim   Taoist    Marital Status   Single                            Admission Date   11/20/23    Admission Type   Elective    Admitting Provider   Woodrow Agee MD    Attending Provider   Woodrow Agee MD    Department, Room/Bed   [facility] LABOR DELIVERY, N305/1       Discharge Date       Discharge Disposition       Discharge Destination                                 Attending Provider: Woodrow Agee MD    Allergies: Amoxicillin-pot Clavulanate, Sulfa Antibiotics, Sulfamethoxazole-trimethoprim    Isolation: None   Infection: None   Code Status: CPR    Ht: 165.1 cm (65\")   Wt: 112 kg (248 lb)    Admission Cmt: None   Principal Problem: Pregnancy [Z34.90]                   Active Insurance as of 11/20/2023       Primary Coverage       Payor Plan Insurance Group Employer/Plan Group    [employer] [place] [employer] [place]ICA[place]        Payor Plan Address Payor Plan Phone Number Payor Plan Fax Number Effective Dates    [address] [address] [phone]  2/23/2021 - None Entered    [facility] [id number]         Subscriber Name Subscriber Birth Date Member ID       HEATHER GARCIA MONTANA 2000 [id number]                     Emergency Contacts        (Rel.) Home Phone Work Phone Mobile Phone    ERNIEWOODROW POWER (Significant Other) [phone] -- [phone]              Insurance Information                  [facility] MEDICAID Phone: [phone]    Subscriber: Heather Gar[facility] [id number]    Group#: -- Precert#: --           " "  History & Physical        Woodrow Agee MD at 23 0818          [facility]  Obstetric History and Physical    Referring Provider: Woodrow Agee MD      Chief Complaint   Patient presents with    Scheduled Induction       Subjective    Patient is a 22 y.o. female  currently at 39w1d, who presents for IOL .    Her prenatal care has been uneventful.  Her previous obstetric/gynecological history is noted for is remarkable for prior vaginal delivery at [facility] .    The following portions of the patients history were reviewed and updated as appropriate: current medications, allergies, past medical history, past surgical history, past family history, past social history, and problem list .       Prenatal Information:   Maternal Prenatal Labs  Blood Type ABO Type   Date Value Ref Range Status   2023 O  Final      Rh Status RH type   Date Value Ref Range Status   2023 Positive  Final      Antibody Screen Antibody Screen   Date Value Ref Range Status   2023 Negative  Final      Gonnorhea No results found for: \"GCCX\"   Chlamydia No results found for: \"CLAMYDCU\"   RPR No results found for: \"RPR\"   Syphilis Antibody No results found for: \"SYPHILIS\"   Rubella No results found for: \"RUBELLAIGGIN\"   Hepatitis B Surface Antigen No results found for: \"HEPBSAG\"   HIV-1 Antibody No results found for: \"LABHIV1\"   Hepatitis C Antibody No results found for: \"HEPCAB\"   Rapid Urin Drug Screen No results found for: \"AMPMETHU\", \"BARBITSCNUR\", \"LABBENZSCN\", \"LABMETHSCN\", \"LABOPIASCN\", \"THCURSCR\", \"COCAINEUR\", \"AMPHETSCREEN\", \"PROPOXSCN\", \"BUPRENORSCNU\", \"METAMPSCNUR\", \"OXYCODONESCN\", \"TRICYCLICSCN\"   Group B Strep Culture No results found for: \"GBSANTIGEN\"           External Prenatal Results       Pregnancy Outside Results - Transcribed From Office Records - See Scanned Records For Details       Test Value Date Time    ABO  O  23    Rh  Positive  23    Antibody Screen  " Negative  23       Negative  23       Negative  23 1405       Negative  23 1506    Varicella IgG       Rubella  1.43 index 23 1506    Hgb  12.0 g/dL 23       12.3 g/dL 23       12.9 g/dL 23 1405       13.4 g/dL 23        14.0 g/dL 23 1506    Hct  37.0 % 23       37.8 % 23       37.4 % 23 1405       40.1 % 23        41.2 % 23 1506    Glucose Fasting GTT       Glucose Tolerance Test 1 hour       Glucose Tolerance Test 3 hour       Gonorrhea (discrete)  Negative  23 1506    Chlamydia (discrete)  Negative  23 1506    RPR  Non Reactive  23 1506    VDRL       Syphilis Antibody       HBsAg  Negative  23 1506    Herpes Simplex Virus PCR       Herpes Simplex VIrus Culture       HIV  Non Reactive  23 1506    Hep C RNA Quant PCR       Hep C Antibody  Non Reactive  23 1506    AFP  21.0 ng/mL 23 1407    Group B Strep  Negative  10/30/23 1600    GBS Susceptibility to Clindamycin       GBS Susceptibility to Erythromycin       Fetal Fibronectin       Genetic Testing, Maternal Blood                 Drug Screening       Test Value Date Time    Urine Drug Screen       Amphetamine Screen  Negative ng/mL 23 1506    Barbiturate Screen  Negative ng/mL 23 1506    Benzodiazepine Screen  Negative ng/mL 23 1506    Methadone Screen  Negative ng/mL 23 1506    Phencyclidine Screen  Negative ng/mL 23 1506    Opiates Screen       THC Screen       Cocaine Screen       Propoxyphene Screen  Negative ng/mL 23 1506    Buprenorphine Screen       Methamphetamine Screen       Oxycodone Screen       Tricyclic Antidepressants Screen                 Legend    ^: Historical                              Past OB History:       OB History    Para Term  AB Living   2 1 1 0 0 1   SAB IAB Ectopic Molar Multiple Live Births   0 0 0 0 0 1      # Outcome Date  GA Lbr Meet/2nd Weight Sex Delivery Anes PTL Lv   2 Current            1 Term 08/08/22 37w0d  3402 g (7 lb 8 oz) M Vag-Vacuum EPI  BRIAN      Complications: Fourth degree laceration of perineum during delivery, postpartum      Obstetric Comments   Fob #1 - Pregnancy #1 and #2    Pregnancy #1 - 4th degree laceration with vacuum assist/ possible planning p c/s if needed        Past Medical History: Past Medical History:   Diagnosis Date    Endometriosis     History of gestational hypertension 2022    PCOS (polycystic ovarian syndrome)       Past Surgical History Past Surgical History:   Procedure Laterality Date    CHOLECYSTECTOMY      LAPAROSCOPY HYSTEROSCOPY ENDOMETRIAL ABLATION      TONSILLECTOMY      WISDOM TOOTH EXTRACTION        Family History: Family History   Problem Relation Age of Onset    Uterine cancer Mother         cervical cancer    Breast cancer Neg Hx     Ovarian cancer Neg Hx     Colon cancer Neg Hx       Social History:  reports that she has never smoked. She has never used smokeless tobacco.   reports no history of alcohol use.   reports no history of drug use.                   General ROS Negative Findings:Headaches, Visual Changes, Epigastric pain, Anorexia, Nausia/Vomiting, ROM, and Vaginal Bleeding    ROS     All other systems have been reviewed and are neg  Objective      Vital Signs Range for the last 24 hours  Temperature: Temp:  [98 °F (36.7 °C)-98.3 °F (36.8 °C)] 98.3 °F (36.8 °C)   Temp Source: Temp src: Oral   BP: BP: ()/(55-63) 133/63   Pulse: Heart Rate:  [73-98] 86   Respirations: Resp:  [16-18] 18   SPO2:     O2 Amount (l/min):     O2 Devices     Weight: Weight:  [112 kg (248 lb)] 112 kg (248 lb)     Physical Examination:   General:   alert, appears stated age, and cooperative   Skin:   normal   HEENT:     Lungs:   clear to auscultation bilaterally   Heart:   regular rate and rhythm, S1, S2 normal, no murmur, click, rub or gallop   Abdomen:  soft, non-tender; bowel sounds normal;  no masses,  no organomegaly   Lower Extremities    Pelvis:  Vulva and vagina appear normal. Bimanual exam reveals normal uterus and adnexa.         Presentation: vertex   Cervix: Exam by: Method: sterile exam per RN   Dilation:     Effacement: Cervical Effacement: 80%   Station:         Fetal Heart Rate Assessment   Method: Fetal HR Assessment Method: external   Beats/min: Fetal HR (beats/min): 120   Baseline: Fetal HR Baseline: normal range   Varibility: Fetal HR Variability: moderate (amplitude range 6 to 25 bpm)   Accels: Fetal HR Accelerations: greater than/equal to 15 bpm, lasting at least 15 seconds   Decels: Fetal HR Decelerations: absent   Tracing Category:       Uterine Assessment   Method: Method: external tocotransducer   Frequency (min): Contraction Frequency (Minutes): 1-3   Ctx Count in 10 min:     Duration:     Intensity: Contraction Intensity: mild by palpation   Intensity by IUPC:     Resting Tone: Uterine Resting Tone: soft by palpation   Resting Tone by IUPC:     [place] Units:       Laboratory Results:   Lab Results (last 24 hours)       Procedure Component Value Units Date/Time    CBC (No Diff) [[id number]]  (Normal) Collected: 11/20/23 1959    Specimen: Blood Updated: 11/20/23 2033     WBC 9.81 10*3/mm3      RBC 4.39 10*6/mm3      Hemoglobin 12.0 g/dL      Hematocrit 37.0 %      MCV 84.3 fL      MCH 27.3 pg      MCHC 32.4 g/dL      RDW 12.9 %      RDW-SD 39.3 fl      MPV 9.4 fL      Platelets 251 10*3/mm3           Radiology Review:   Imaging Results (Last 24 Hours)       ** No results found for the last 24 hours. **          Other Studies:    Assessment & Plan      Pregnancy        Assessment:  1.  Intrauterine pregnancy at 39w1d weeks gestation with reactive fetal status.    2.  GBS negative    Plan:  1. Vaginal anticipated  2. Plan of care has been reviewed with patient.  3.  Risks, benefits of treatment plan have been discussed.  4.  All questions have been answered.  5      Woodrow Morrison  MD Cyndie  11/21/2023  08:18 EST    Electronically signed by Woodrow Agee MD at 11/21/23 0820       Facility-Administered Medications as of 11/21/2023   Medication Dose Route Frequency Provider Last Rate Last Admin    acetaminophen (TYLENOL) tablet 650 mg  650 mg Oral Q4H PRN Woodrow Agee MD        acetaminophen (TYLENOL) tablet 650 mg  650 mg Oral Q4H PRN Woodrow Agee MD        carboprost (HEMABATE) injection 250 mcg  250 mcg Intramuscular PRN Woodrow Agee MD        diphenhydrAMINE (BENADRYL) injection 12.5 mg  12.5 mg Intravenous Q8H PRN Arlin Cortez CRNA        ePHEDrine Sulfate (Pressors) 5 MG/ML injection 10 mg  10 mg Intravenous Q10 Min PRN Arlin Cortez CRNA   10 mg at 11/21/23 0949    famotidine (PEPCID) injection 20 mg  20 mg Intravenous Once PRN Arlin Cortez CRNA        HYDROcodone-acetaminophen (NORCO) 5-325 MG per tablet 1 tablet  1 tablet Oral Q4H PRN Woodrow Agee MD        ibuprofen (ADVIL,MOTRIN) tablet 600 mg  600 mg Oral Q6H PRN Woodrow Agee MD        lactated ringers bolus 1,000 mL  1,000 mL Intravenous PRN Arlin Cortez CRNA        lactated ringers bolus 1,500 mL  1,500 mL Intravenous Once Woodrow Agee MD        lactated ringers infusion  125 mL/hr Intravenous Continuous Woodrow Agee  mL/hr at 11/21/23 0916 125 mL/hr at 11/21/23 0916    lidocaine PF 1% (XYLOCAINE) injection 0.5 mL  0.5 mL Intradermal Once PRN Woodrow Agee MD        methylergonovine (METHERGINE) injection 200 mcg  200 mcg Intramuscular Once PRN Woodrow Agee MD        metoclopramide (REGLAN) injection 10 mg  10 mg Intravenous Once PRN Arlin Cortez CRNA        mineral oil liquid 30 mL  30 mL Topical Once Woodrow Agee MD        miSOPROStol (CYTOTEC) tablet 800 mcg  800 mcg Rectal PRN Woodrow Agee MD        morphine injection 2  mg  2 mg Intravenous Q2H PRN Zohaib Feliciano MD   2 mg at 23 0322    And    naloxone (NARCAN) injection 0.4 mg  0.4 mg Intravenous Q5 Min PRN Zohaib Feliciano MD        morphine injection 4 mg  4 mg Intravenous Q2H PRN Zohaib Feliciano MD        ondansetron (ZOFRAN) tablet 4 mg  4 mg Oral Q6H PRN Woodrow Agee MD        Or    ondansetron (ZOFRAN) injection 4 mg  4 mg Intravenous Q6H PRN Woodrow Agee MD        ondansetron (ZOFRAN) injection 4 mg  4 mg Intravenous Once PRN Arlin Cortez CRNA        ondansetron (ZOFRAN) tablet 4 mg  4 mg Oral Q6H PRN Woodrow Agee MD        Or    ondansetron (ZOFRAN) injection 4 mg  4 mg Intravenous Q6H PRN Woodrow Agee MD        [] oxytocin (PITOCIN) 30 units in 0.9% sodium chloride 500 mL (premix)  250 mL/hr Intravenous Continuous Pipe Armenta MD        oxytocin (PITOCIN) 30 units in 0.9% sodium chloride 500 mL (premix)  2-20 lg-units/min Intravenous Titrated Woodrow Agee MD 6 mL/hr at 23 0927 6 lg-units/min at 23 0927    oxytocin (PITOCIN) 30 units in 0.9% sodium chloride 500 mL (premix)  999 mL/hr Intravenous Once Woodrow Agee  mL/hr at 23 1042 999 mL/hr at 23 1042    Followed by    oxytocin (PITOCIN) 30 units in 0.9% sodium chloride 500 mL (premix)  250 mL/hr Intravenous Continuous Woodrow Agee MD        promethazine (PHENERGAN) suppository 12.5 mg  12.5 mg Rectal Q6H PRN Woodrow Agee MD        Or    promethazine (PHENERGAN) tablet 12.5 mg  12.5 mg Oral Q6H PRN Woodrow Agee MD        promethazine (PHENERGAN) suppository 12.5 mg  12.5 mg Rectal Q6H PRN Woodrow Agee MD        Or    promethazine (PHENERGAN) tablet 12.5 mg  12.5 mg Oral Q6H PRN Woodrow Agee MD        ropivacaine (NAROPIN) 0.2 % injection  15 mL/hr Epidural Continuous Arlin Cortez CRNA   Stopped at 23 1045    Sod  Citrate-Citric Acid (BICITRA) oral solution 30 mL  30 mL Oral Once PRN Woodrow Agee MD        Sod Citrate-Citric Acid (BICITRA) oral solution 30 mL  30 mL Oral Once Arlin Cortez CRNA        sodium chloride 0.9 % flush 10 mL  10 mL Intravenous Q12H Woodrow Agee MD        sodium chloride 0.9 % flush 10 mL  10 mL Intravenous PRN Woodrow Agee MD        sodium chloride 0.9 % infusion 40 mL  40 mL Intravenous PRN Woodrow Agee MD        terbutaline (BRETHINE) injection 0.25 mg  0.25 mg Subcutaneous PRN Woodrow Agee MD         Orders (last 24 hrs)        Start     Ordered    11/21/23 1300  oxytocin (PITOCIN) 30 units in 0.9% sodium chloride 500 mL (premix)  Continuous        See Hyperspace for full Linked Orders Report.    11/21/23 1151    11/21/23 1245  oxytocin (PITOCIN) 30 units in 0.9% sodium chloride 500 mL (premix)  Once        See Hyperspace for full Linked Orders Report.    11/21/23 1151    11/21/23 1204  VTE Prophylaxis Not Indicated: No Risk Factors (0); </= 3 (Low Risk)  Once         11/21/23 1203    11/21/23 1152  Nurse may remove epidural catheter after delivery.  Until Discontinued         11/21/23 1151    11/21/23 1152  Transfer to postpartum when discharge criteria met.  Until Discontinued         11/21/23 1151    11/21/23 1152  Notify Physician (specified)  Until Discontinued         11/21/23 1151    11/21/23 1152  Vital Signs Per hospital policy  Per Hospital Policy         11/21/23 1151    11/21/23 1152  Up Ad Alannah  Until Discontinued         11/21/23 1151    11/21/23 1152  Fundal & Lochia Check  Per Order Details        Comments: Every 15 Minutes x4, Then Every 30 Minutes x2, Then Every Shift    11/21/23 1151    11/21/23 1152  Diet: Regular/House Diet; Texture: Regular Texture (IDDSI 7); Fluid Consistency: Thin (IDDSI 0)  Diet Effective Now         11/21/23 1151    11/21/23 1152  If indicated -- Please administer RH Immunoglobulin based on  results of cord blood evaluation and fetal screen lab tests, pharmacy to dispense  Per Order Details        Comments: See Process Instructions For Reference Range Details.    11/21/23 1151    11/21/23 1151  Fundal & Lochia Check  Every Shift       11/21/23 1151    11/21/23 1151  acetaminophen (TYLENOL) tablet 650 mg  Every 4 Hours PRN         11/21/23 1151    11/21/23 1151  ibuprofen (ADVIL,MOTRIN) tablet 600 mg  Every 6 Hours PRN         11/21/23 1151    11/21/23 1151  HYDROcodone-acetaminophen (NORCO) 5-325 MG per tablet 1 tablet  Every 4 Hours PRN         11/21/23 1151    11/21/23 1151  methylergonovine (METHERGINE) injection 200 mcg  Once As Needed         11/21/23 1151    11/21/23 1151  carboprost (HEMABATE) injection 250 mcg  As Needed         11/21/23 1151    11/21/23 1151  miSOPROStol (CYTOTEC) tablet 800 mcg  As Needed         11/21/23 1151    11/21/23 1151  ondansetron (ZOFRAN) tablet 4 mg  Every 6 Hours PRN        See Hyperspace for full Linked Orders Report.    11/21/23 1151    11/21/23 1151  ondansetron (ZOFRAN) injection 4 mg  Every 6 Hours PRN        See Hyperspace for full Linked Orders Report.    11/21/23 1151    11/21/23 1151  promethazine (PHENERGAN) suppository 12.5 mg  Every 6 Hours PRN        See Hyperspace for full Linked Orders Report.    11/21/23 1151    11/21/23 1151  promethazine (PHENERGAN) tablet 12.5 mg  Every 6 Hours PRN        See Hyperspace for full Linked Orders Report.    11/21/23 1151    11/21/23 0930  ropivacaine (NAROPIN) 0.2 % injection  Continuous         11/21/23 0834    11/21/23 0930  Sod Citrate-Citric Acid (BICITRA) oral solution 30 mL  Once         11/21/23 0834    11/21/23 0835  Vital Signs Per Anesthesia Guidelines  Per Order Details        Comments: Every 3 Minutes x20 Minutes Following Epidural Dosing, Then Every 15 Minutes If Stable    11/21/23 0834    11/21/23 0835  Start IV with #16 or #18 gauge angiocath.  Once         11/21/23 0834    11/21/23 0835  Nurse or  anesthesiologist to remain with patient for 15 minutes following dosing.  Until Discontinued         11/21/23 0834    11/21/23 0835  Facilitate maternal postion on side and maintain uterine displacement.  Until Discontinued         11/21/23 0834    11/21/23 0835  Consult anesthesia services prior to changing epidural infusion/rate.  Until Discontinued         11/21/23 0834    11/21/23 0834  ePHEDrine Sulfate (Pressors) 5 MG/ML injection 10 mg  Every 10 Minutes PRN         11/21/23 0834    11/21/23 0834  metoclopramide (REGLAN) injection 10 mg  Once As Needed         11/21/23 0834    11/21/23 0834  ondansetron (ZOFRAN) injection 4 mg  Once As Needed         11/21/23 0834    11/21/23 0834  famotidine (PEPCID) injection 20 mg  Once As Needed         11/21/23 0834    11/21/23 0834  diphenhydrAMINE (BENADRYL) injection 12.5 mg  Every 8 Hours PRN         11/21/23 0834    11/21/23 0834  lactated ringers bolus 1,000 mL  As Needed         11/21/23 0834    11/21/23 0311  morphine injection 4 mg  Every 2 Hours PRN         11/21/23 0312    11/21/23 0311  morphine injection 2 mg  Every 2 Hours PRN        See Hyperspace for full Linked Orders Report.    11/21/23 0312 11/21/23 0310  naloxone (NARCAN) injection 0.4 mg  Every 5 Minutes PRN        See Hyperspace for full Linked Orders Report.    11/21/23 0312 11/20/23 2100  sodium chloride 0.9 % flush 10 mL  Every 12 Hours Scheduled         11/20/23 1905 11/20/23 2000  Vital Signs q 4 while awake  Every 4 Hours      Comments: While the patient is awake.    11/20/23 1905 11/20/23 2000  lactated ringers bolus 1,500 mL  Once         11/20/23 1905 11/20/23 2000  lactated ringers infusion  Continuous         11/20/23 1905 11/20/23 2000  mineral oil liquid 30 mL  Once         11/20/23 1905 11/20/23 2000  oxytocin (PITOCIN) 30 units in 0.9% sodium chloride 500 mL (premix)  Titrated         11/20/23 1905    11/20/23 1906  Admit To Obstetrics Inpatient  Once          23  Obtain informed consent  Once         23  Place Sequential Compression Device  Once         23  Maintain Sequential Compression Device  Continuous         23  Vital Signs Per hospital policy  Per Hospital Policy         23  Keep exams to a minimum on patients with SROM  Continuous         23  Continuous Fetal Monitoring With NST on Admission and Prior to Initiation of Oxytocin.  Per Order Details        Comments: Continuous Fetal Monitoring With NST on Admission & Prior to Initiation of Oxytocin.    23  External Uterine Contraction Monitoring  Per Hospital Policy         23  Notify Physician (specified)  Until Discontinued         23  Notify physician for hyperstimulus (per hospital algorithm)  Until Discontinued         23  Notify physician if membranes ruptured, bleeding greater than 1 pad an hour, fetal heart tone abnormality, and severe pain  Until Discontinued         23  Up Ad Alannah  Until Discontinued         23  Initiate Group Beta Strep (GBS) Prophylaxis Protocol, If Criteria Met  Continuous        Comments: NO TREATMENT RECOMMENDED IF: 1)  Maternal GBS status known negative 2)  Scheduled  birth with intact membranes, not in labor.  3 ) Maternal GBS unknown, no risk factors.   TREAT WITH ANTIBIOTICS IF:  1)  Maternal GBS status is known postive.  2)  Maternal GBS status unknown with these risk factors:  a)  Previous infant affected by GBS infection.  b)  GBS urinary tract infection (UTI) or bacteruria during pregnancy  c)  Unexplained maternal fever in labor (greater than or equal to 100.4F or 38.0C)  d)  Prolonged rupture of the membranes greater than or equal to 18  hours.  e)  Gestational age less than 37 weeks.    11/20/23 1905 11/20/23 1906  Phillips Bulb Cervical Ripening  Once        Comments: Have Laborist Place Cervical Phillips With Infusion.  If Unable to Place Phillips, Start Low Dose Pitocin Drip Overnight, Then Increase Per Protocol at 0500 Next Morning    11/20/23 1905 11/20/23 1906  NPO Diet NPO Type: Ice Chips  Diet Effective Now,   Status:  Canceled         11/20/23 1905 11/20/23 1906  CBC (No Diff)  Once         11/20/23 1905 11/20/23 1906  Type & Screen  Once         11/20/23 1905 11/20/23 1906  Insert Peripheral IV  Once         11/20/23 1905 11/20/23 1906  Saline Lock & Maintain IV Access  Continuous         11/20/23 1905 11/20/23 1906  Code Status and Medical Interventions:  Continuous         11/20/23 1905 11/20/23 1905  sodium chloride 0.9 % flush 10 mL  As Needed         11/20/23 1905 11/20/23 1905  sodium chloride 0.9 % infusion 40 mL  As Needed         11/20/23 1905 11/20/23 1905  lidocaine PF 1% (XYLOCAINE) injection 0.5 mL  Once As Needed         11/20/23 1905 11/20/23 1905  acetaminophen (TYLENOL) tablet 650 mg  Every 4 Hours PRN         11/20/23 1905 11/20/23 1905  ondansetron (ZOFRAN) tablet 4 mg  Every 6 Hours PRN        See Hyperspace for full Linked Orders Report.    11/20/23 1905 11/20/23 1905  ondansetron (ZOFRAN) injection 4 mg  Every 6 Hours PRN        See Hyperspace for full Linked Orders Report.    11/20/23 1905 11/20/23 1905  promethazine (PHENERGAN) suppository 12.5 mg  Every 6 Hours PRN        See Hyperspace for full Linked Orders Report.    11/20/23 1905 11/20/23 1905  promethazine (PHENERGAN) tablet 12.5 mg  Every 6 Hours PRN        See Hyperspace for full Linked Orders Report.    11/20/23 1905 11/20/23 1905  terbutaline (BRETHINE) injection 0.25 mg  As Needed         11/20/23 1905 11/20/23 1905  Sod Citrate-Citric Acid (BICITRA) oral solution 30 mL  Once As Needed         11/20/23 1905     11/20/23 1905  morphine injection 1 mg  Every 4 Hours PRN,   Status:  Discontinued        See Hyperspace for full Linked Orders Report.    11/20/23 1905 11/20/23 1905  naloxone (NARCAN) injection 0.4 mg  Every 5 Minutes PRN,   Status:  Discontinued        See Hyperspace for full Linked Orders Report.    11/20/23 1905 11/20/23 1905  morphine injection 1 mg  Every 4 Hours PRN,   Status:  Discontinued        See Hyperspace for full Linked Orders Report.    11/20/23 1905 11/20/23 1905  naloxone (NARCAN) injection 0.4 mg  Every 5 Minutes PRN,   Status:  Discontinued        See Hyperspace for full Linked Orders Report.    11/20/23 1905    Unscheduled  Position change  As Needed      Comments: For intra-uterine resuscitation for hypertonus, hypertstimulation, or non-reassuring fetal status    11/20/23 1905    Signed and Held  Code Status and Medical Interventions:  Continuous         Signed and Held    Signed and Held  Vital Signs Per hospital policy  Per Hospital Policy         Signed and Held    Signed and Held  Notify Physician  Until Discontinued         Signed and Held    Signed and Held  Up Ad Alannah  Until Discontinued         Signed and Held    Signed and Held  Ambulate Patient  Every Shift       Signed and Held    Signed and Held  Patient May Shower  Once         Signed and Held    Signed and Held  Advance Diet As Tolerated -Regular  Until Discontinued         Signed and Held    Signed and Held  Fundal and Lochia Check  Per Hospital Policy        Comments: Q 15 min x 4, Q 30 min x 2, then Q Shift    Signed and Held    Signed and Held  RN to Assess Rh Status & Place RhIG Evaluation Order if Indicated  Continuous         Signed and Held    Signed and Held  Bladder Assessment  Per Order Details        Comments: Postpartum 1) Upon Admission to Unit & Every 4 Hours PRN Until Voiding. 2) Out of Bed to Void in 8 Hours.    Signed and Held    Signed and Held  Straight Cath  Per Order Details        Comments:  Postpartum: If Distended & Unable to Void, May Repeat Once.    Signed and Held    Signed and Held  Indwelling Urinary Catheter  Per Order Details        Comments: Postpartum : After Straight Cathed x2 or if Greater Than 1000mL Residual, Insert Indwelling Urinary Catheter Until Further MD Order.    Signed and Held    Signed and Held  Apply Ice to Perineum  As Needed        Comments: For 20 min q 2 hrs    Signed and Held    Signed and Held  Sitz Bath  3 Times Daily        Comments: PRN    Signed and Held    Signed and Held  Warm compress  As Needed         Signed and Held    Signed and Held  Breast pump to bed  Once         Signed and Held    Signed and Held  Apply ace wrap, tight bra, or binder  As Needed         Signed and Held    Signed and Held  Apply ice packs  As Needed         Signed and Held    Signed and Held  If indicated -- Please administer RH Immunoglobulin based on results of cord blood evaluation and fetal screen lab tests, pharmacy to dispense  Per Order Details        Comments: See Process Instructions For Reference Range Details.    Signed and Held    Signed and Held  CBC & Differential  Timed        Comments: Postpartum Day 1      Signed and Held    Signed and Held  Hemoglobin & Hematocrit, Blood  Timed        Comments: Postpartum Day 1      Signed and Held    Signed and Held  sodium chloride 0.9 % flush 1-10 mL  As Needed         Signed and Held    Signed and Held  oxytocin (PITOCIN) 30 units in 0.9% sodium chloride 500 mL (premix)  Continuous PRN         Signed and Held    Signed and Held  ibuprofen (ADVIL,MOTRIN) tablet 600 mg  Every 6 Hours PRN         Signed and Held    Signed and Held  acetaminophen (TYLENOL) tablet 650 mg  Every 6 Hours PRN         Signed and Held    Signed and Held  HYDROcodone-acetaminophen (NORCO) 5-325 MG per tablet 1 tablet  Every 4 Hours PRN        [facility].    Signed and Held    Signed and Held  bisacodyl (DULCOLAX) suppository 10 mg   Daily PRN         Signed and Held    Signed and Held  magnesium hydroxide (MILK OF MAGNESIA) suspension 10 mL  Daily PRN         Signed and Held    Signed and Held  docusate sodium (COLACE) capsule 100 mg  2 Times Daily         Signed and Held    Signed and Held  lanolin topical 1 application   Every 1 Hour PRN         Signed and Held    Signed and Held  benzocaine-menthol (DERMOPLAST) 20-0.5 % topical spray  As Needed         Signed and Held    Signed and Held  witch hazel-glycerin (TUCKS) pad  As Needed         Signed and Held    Signed and Held  Hydrocortisone (Perianal) (ANUSOL-HC) 2.5 % rectal cream 1 application   As Needed         Signed and Held    Signed and Held  benzocaine (AMERICAINE) 20 % rectal ointment 1 application   As Needed         Signed and Held    Signed and Held  promethazine (PHENERGAN) tablet 25 mg  Once As Needed        See [facility] for full Linked Orders Report.    Signed and Held    Signed and Held  promethazine (PHENERGAN) suppository 12.5 mg  Once As Needed        See Hyperspace for full Linked Orders Report.    Signed and Held    Signed and Held  metoclopramide (REGLAN) tablet 10 mg  Once As Needed         Signed and Held    Signed and Held  HYDROcodone-acetaminophen (NORCO) 5-325 MG per tablet 2 tablet  Every 6 Hours PRN        See [facility] for full Linked Orders Report.    Signed and Held                     Operative/Procedure Notes (last 24 hours)        Woodrow Agee MD at [address] [place] [address]  Vaginal Delivery Note   Review the Delivery Report for details.       Delivery     Delivery: Vaginal, Spontaneous     YOB: 2022    Time of Birth:  Gestational Age 10:38 AM   39w1d     Anesthesia: Epidural     Delivering clinician: Woodrow Agee    Forceps?   No   Vacuum? No    Shoulder dystocia present: No        Delivery narrative:  The patient delivered a viable male infant by  from the NICOLE presentation over intact perineum. Suction  "performed at delivery and infant placed on maternal chest. Delayed cord clamping performed. Pitocin given IV and placenta delivered with gentle downward traction. A 1st degree perineal laceration repaired with 3.0 vicryl. Good hemostasis noted.     Infant    Findings: male  infant     Infant observations: Weight: 3695 g (8 lb 2.3 oz)   Length: 20  in  Observations/Comments:        Apgars: 8  @ 1 minute /    9  @ 5 minutes   Infant Name:      Placenta, Cord, and Fluid    Placenta delivered  Spontaneous  at   11/21/2023 10:41 AM     Cord: 3 vessels  present.   Nuchal Cord?  no   Cord blood obtained: Yes    Cord gases obtained:  No    Cord gas results: Venous:  No results found for: \"PHCVEN\", \"BECVEN\"    Arterial:  No results found for: \"PHCART\", \"BECART\"     Repair    Episiotomy: Not recorded     No    Lacerations: Yes  Laceration Information  Laceration Repaired?   Perineal:  1st  yes   Periurethral:       Labial:       Sulcus:       Vaginal:       Cervical:         Suture used for repair: 3-0 Vicryl  Laceration Length for 3rd or 4th degree lacerations: NA cm   Estimated Blood Loss:               Quantitative Blood Loss:                  Complications  none    Disposition  Mother to Mother Baby/Postpartum  in stable condition currently.  Baby to NBN  in stable condition currently.      Woodrow Agee MD  11/21/23  12:00 EST          Electronically signed by Woodrow Agee MD at 11/21/23 1202       Physician Progress Notes (last 24 hours)  Notes from 11/20/23 1239 through 11/21/23 1239   No notes of this type exist for this encounter.       "

## 2023-11-21 NOTE — ANESTHESIA PREPROCEDURE EVALUATION
Anesthesia Evaluation     Patient summary reviewed and Nursing notes reviewed   NPO Solid Status: > 6 hours  NPO Liquid Status: < 2 hours           Airway   Mallampati: II  TM distance: >3 FB  Neck ROM: full  Dental      Pulmonary - negative pulmonary ROS   Cardiovascular - negative cardio ROS        Neuro/Psych- negative ROS  GI/Hepatic/Renal/Endo    (+) morbid obesity    Musculoskeletal (-) negative ROS    Abdominal    Substance History - negative use     OB/GYN    (+) Pregnant        Other                    Anesthesia Plan    ASA 3     epidural       Anesthetic plan, risks, benefits, and alternatives have been provided, discussed and informed consent has been obtained with: patient.    Plan discussed with attending.    CODE STATUS:    Level Of Support Discussed With: Patient  Code Status (Patient has no pulse and is not breathing): CPR (Attempt to Resuscitate)  Medical Interventions (Patient has pulse or is breathing): Full Support

## 2023-11-21 NOTE — LACTATION NOTE
11/21/23 1340   Maternal Information   Date of Referral 11/21/23   Person Making Referral lactation consultant   Maternal Reason for Referral no prior breastfeeding experience  (First time nursing mom.  Assisted with latching infant in football hold on left breast.  Infant latched and nursed well.  Breastfeeding education provided, information given.  Mom has S1 breast pump at home.)   Maternal Assessment   Breast Size Issue none   Breast Shape Bilateral:;round   Breast Density Bilateral:;soft   Nipples Bilateral:;everted   Left Nipple Symptoms intact;nontender   Right Nipple Symptoms intact;nontender   Maternal Infant Feeding   Maternal Emotional State receptive   Infant Positioning clutch/football   Signs of Milk Transfer audible swallow;deep jaw excursions noted   Pain with Feeding no   Comfort Measures Before/During Feeding infant position adjusted;maternal position adjusted   Nipple Shape After Feeding, Left Breast round;symmetrical;appropriately projected   Latch Assistance minimal assistance;verbal guidance offered   Support Person Involvement verbally supports mother   Milk Expression/Equipment   Breast Pump Type double electric, personal   Equipment for Home Use breast pump ordered through insurance

## 2023-11-22 LAB
BASOPHILS # BLD AUTO: 0.05 10*3/MM3 (ref 0–0.2)
BASOPHILS NFR BLD AUTO: 0.5 % (ref 0–1.5)
DEPRECATED RDW RBC AUTO: 40 FL (ref 37–54)
EOSINOPHIL # BLD AUTO: 0.09 10*3/MM3 (ref 0–0.4)
EOSINOPHIL NFR BLD AUTO: 0.9 % (ref 0.3–6.2)
ERYTHROCYTE [DISTWIDTH] IN BLOOD BY AUTOMATED COUNT: 12.9 % (ref 12.3–15.4)
HCT VFR BLD AUTO: 32 % (ref 34–46.6)
HGB BLD-MCNC: 10.3 G/DL (ref 12–15.9)
IMM GRANULOCYTES # BLD AUTO: 0.04 10*3/MM3 (ref 0–0.05)
IMM GRANULOCYTES NFR BLD AUTO: 0.4 % (ref 0–0.5)
LYMPHOCYTES # BLD AUTO: 3.39 10*3/MM3 (ref 0.7–3.1)
LYMPHOCYTES NFR BLD AUTO: 32.6 % (ref 19.6–45.3)
MCH RBC QN AUTO: 27.6 PG (ref 26.6–33)
MCHC RBC AUTO-ENTMCNC: 32.2 G/DL (ref 31.5–35.7)
MCV RBC AUTO: 85.8 FL (ref 79–97)
MONOCYTES # BLD AUTO: 0.91 10*3/MM3 (ref 0.1–0.9)
MONOCYTES NFR BLD AUTO: 8.7 % (ref 5–12)
NEUTROPHILS NFR BLD AUTO: 5.93 10*3/MM3 (ref 1.7–7)
NEUTROPHILS NFR BLD AUTO: 56.9 % (ref 42.7–76)
NRBC BLD AUTO-RTO: 0 /100 WBC (ref 0–0.2)
PLATELET # BLD AUTO: 177 10*3/MM3 (ref 140–450)
PMV BLD AUTO: 9.7 FL (ref 6–12)
RBC # BLD AUTO: 3.73 10*6/MM3 (ref 3.77–5.28)
WBC NRBC COR # BLD AUTO: 10.41 10*3/MM3 (ref 3.4–10.8)

## 2023-11-22 PROCEDURE — 85025 COMPLETE CBC W/AUTO DIFF WBC: CPT | Performed by: OBSTETRICS & GYNECOLOGY

## 2023-11-22 PROCEDURE — 0503F POSTPARTUM CARE VISIT: CPT | Performed by: NURSE PRACTITIONER

## 2023-11-22 RX ADMIN — ACETAMINOPHEN 650 MG: 325 TABLET ORAL at 02:29

## 2023-11-22 RX ADMIN — ACETAMINOPHEN 650 MG: 325 TABLET ORAL at 22:46

## 2023-11-22 RX ADMIN — IBUPROFEN 600 MG: 600 TABLET, FILM COATED ORAL at 20:11

## 2023-11-22 RX ADMIN — DOCUSATE SODIUM 100 MG: 100 CAPSULE, LIQUID FILLED ORAL at 22:46

## 2023-11-22 RX ADMIN — IBUPROFEN 600 MG: 600 TABLET, FILM COATED ORAL at 02:29

## 2023-11-22 RX ADMIN — DOCUSATE SODIUM 100 MG: 100 CAPSULE, LIQUID FILLED ORAL at 08:21

## 2023-11-22 NOTE — ANESTHESIA POSTPROCEDURE EVALUATION
Patient: Heather Garcia    Procedure Summary       Date: 11/21/23 Room / Location:     Anesthesia Start: 0842 Anesthesia Stop: 1038    Procedure: LABOR ANALGESIA Diagnosis:     Scheduled Providers:  Provider: Maximo Banuelos MD    Anesthesia Type: epidural ASA Status: 3            Anesthesia Type: epidural    Vitals  Vitals Value Taken Time   /58 11/22/23 1540   Temp 98.8 °F (37.1 °C) 11/22/23 1540   Pulse 80 11/22/23 1540   Resp 18 11/22/23 1540   SpO2 100 % 11/21/23 0848           Post Anesthesia Care and Evaluation    Patient location during evaluation: bedside  Patient participation: complete - patient participated  Level of consciousness: awake  Pain score: 0  Pain management: satisfactory to patient    Airway patency: patent  Anesthetic complications: No anesthetic complications  PONV Status: none  Cardiovascular status: acceptable and hemodynamically stable  Respiratory status: acceptable  Hydration status: acceptable  Post Neuraxial Block status: Motor and sensory function returned to baseline and No signs or symptoms of PDPH

## 2023-11-22 NOTE — PROGRESS NOTES
Postpartum Progress Note    Patient name: Heather Garcia  YOB: 2000   MRN: [id number]  Referring Provider: Woodrow Agee MD  Admission Date: 2023  Date of Service: 2023    ID: 22 y.o.     Diagnosis:   S/p vaginal delivery     Pregnancy       Subjective:      No complaints.  Moderate lochia.  Ambulating, voiding, tolerating diet.  Pain well controlled.  The patient is currently breastfeeding.   This baby is a male    Objective:      Vital signs:  Vital Signs Range for the last 24 hours  Temperature: Temp:  [97.9 °F (36.6 °C)-98.9 °F (37.2 °C)] 98 °F (36.7 °C)   Temp Source: Temp src: Oral   BP: BP: ()/(44-80) 120/71   Pulse: Heart Rate:  [] 82   Respirations: Resp:  [16-18] 18   Weight: 112 kg (248 lb)     General: Alert & oriented x4, in no apparent distress  Abdomen: soft, nontender  Uterus: firm, nontender  Extremities: nontender; no edema      Labs:  Lab Results   Component Value Date    WBC 10.41 2023    HGB 10.3 (L) 2023    HCT 32.0 (L) 2023    MCV 85.8 2023     2023     Results from last 7 days   Lab Units 23   ABO TYPING  O   RH TYPING  Positive     External Prenatal Results       Pregnancy Outside Results - Transcribed From Office Records - See Scanned Records For Details       Test Value Date Time    ABO  O  23    Rh  Positive  23    Antibody Screen  Negative  23       Negative  23       Negative  23 1405       Negative  23 1506    Varicella IgG       Rubella  1.43 index 23 1506    Hgb  10.3 g/dL 23 0414       12.0 g/dL 23       12.3 g/dL 23       12.9 g/dL 23 1405       13.4 g/dL 23        14.0 g/dL 23 1506    Hct  32.0 % 23 0414       37.0 % 239       37.8 % 238       37.4 % 23 1405       40.1 % 23        41.2 % 23 1506    Glucose Fasting GTT        Glucose Tolerance Test 1 hour       Glucose Tolerance Test 3 hour       Gonorrhea (discrete)  Negative  05/24/23 1506    Chlamydia (discrete)  Negative  05/24/23 1506    RPR  Non Reactive  05/24/23 1506    VDRL       Syphilis Antibody       HBsAg  Negative  05/24/23 1506    Herpes Simplex Virus PCR       Herpes Simplex VIrus Culture       HIV  Non Reactive  05/24/23 1506    Hep C RNA Quant PCR       Hep C Antibody  Non Reactive  05/24/23 1506    AFP  21.0 ng/mL 06/21/23 1407    Group B Strep  Negative  10/30/23 1600    GBS Susceptibility to Clindamycin       GBS Susceptibility to Erythromycin       Fetal Fibronectin       Genetic Testing, Maternal Blood                 Drug Screening       Test Value Date Time    Urine Drug Screen       Amphetamine Screen  Negative ng/mL 05/24/23 1506    Barbiturate Screen  Negative ng/mL 05/24/23 1506    Benzodiazepine Screen  Negative ng/mL 05/24/23 1506    Methadone Screen  Negative ng/mL 05/24/23 1506    Phencyclidine Screen  Negative ng/mL 05/24/23 1506    Opiates Screen       THC Screen       Cocaine Screen       Propoxyphene Screen  Negative ng/mL 05/24/23 1506    Buprenorphine Screen       Methamphetamine Screen       Oxycodone Screen       Tricyclic Antidepressants Screen                 Legend    ^: Historical                            Assessment/Plan:      PPD#1 s/p vaginal delivery.  1. S/p vaginal delivery: Doing well.Continue routine postpartum care.    2. Infant feeding: Supportive care.  The patient is currently breastfeeding. Parents desire circumcision

## 2023-11-23 VITALS
BODY MASS INDEX: 41.32 KG/M2 | TEMPERATURE: 98.7 F | RESPIRATION RATE: 16 BRPM | HEIGHT: 65 IN | OXYGEN SATURATION: 100 % | WEIGHT: 248 LBS | SYSTOLIC BLOOD PRESSURE: 121 MMHG | DIASTOLIC BLOOD PRESSURE: 85 MMHG | HEART RATE: 73 BPM

## 2023-11-23 PROBLEM — Z34.90 PREGNANCY: Status: RESOLVED | Noted: 2023-11-20 | Resolved: 2023-11-23

## 2023-11-23 RX ORDER — IBUPROFEN 600 MG/1
600 TABLET ORAL EVERY 6 HOURS PRN
Qty: 60 TABLET | Refills: 0 | Status: SHIPPED | OUTPATIENT
Start: 2023-11-23

## 2023-11-23 RX ADMIN — DOCUSATE SODIUM 100 MG: 100 CAPSULE, LIQUID FILLED ORAL at 07:59

## 2023-11-23 RX ADMIN — IBUPROFEN 600 MG: 600 TABLET, FILM COATED ORAL at 06:01

## 2023-11-23 NOTE — DISCHARGE SUMMARY
Discharge Summary    Date of Admission: 2023  Date of Discharge:  2023      Patient: Heather Garcia      MR#:[id number]    Delivery Provider: Woodrow Agee     Discharge Surgeon/OB: Dr. Woodrow Agee/Maddie ZAIDI    Presenting Problem/History of Present Illness  Pregnancy [Z34.90]  Pregnancy [Z34.90]     Patient Active Problem List    Diagnosis     Third trimester pregnancy [Z34.93]     Normal labor [O80, Z37.9]     Maternal obesity affecting pregnancy, antepartum [O99.210]     Supervision of other normal pregnancy, antepartum [Z34.80]          Discharge Diagnosis: Vaginal delivery at 39w1d    Procedures:  Vaginal, Spontaneous     2023    10:38 AM        Discharge Date: 2023;     Hospital Course  Patient is a 22 y.o. female  at 39w1d status post vaginal delivery without complication. Postpartum the patient did well. She remained afebrile, with vital signs stable. She was ready for discharge on postpartum day 2.     Infant:   male  fetus 3695 g (8 lb 2.3 oz)  with Apgar scores of 8 , 9  at five minutes.    Condition on Discharge:  Stable    Vital Signs  Temp:  [98 °F (36.7 °C)-98.8 °F (37.1 °C)] 98.7 °F (37.1 °C)  Heart Rate:  [73-80] 73  Resp:  [16-18] 16  BP: (121-127)/(58-85) 121/85    Lab Results   Component Value Date    WBC 10.41 2023    HGB 10.3 (L) 2023    HCT 32.0 (L) 2023    MCV 85.8 2023     2023       Discharge Disposition  Home or Self Care    Discharge Medications     Discharge Medications        New Medications        Instructions Start Date   ibuprofen 600 MG tablet  Commonly known as: ADVIL,MOTRIN   600 mg, Oral, Every 6 Hours PRN             Continue These Medications        Instructions Start Date   cholecalciferol 25 MCG (1000 UT) tablet  Commonly known as: VITAMIN D3   1,000 Units, Oral, Daily      PRENATAL 1 PO   Oral             Stop These Medications      aspirin 81 MG chewable tablet              Discharge  Diet:   Diet Instructions       Diet: Regular/House Diet; Regular Texture (IDDSI 7); Thin (IDDSI 0)      Discharge Diet: Regular/House Diet    Texture: Regular Texture (IDDSI 7)    Fluid Consistency: Thin (IDDSI 0)            Activity at Discharge:   Activity Instructions       Sexual Activity Restrictions      Type of Restriction:  Sex  Bathing       Explain Sexual Activity Restrictions: 6 wks    Bathing Restrictions: No Tub Bath            Follow-up Appointments  Future Appointments   Date Time Provider Department Center   1/5/2024 10:40 AM Woodrow Agee MD [facility]  [facility]     Additional Instructions for the Follow-ups that You Need to Schedule       Call MD With Problems / Concerns   As directed      Instructions: Discussed changes in postpartum mood-anxiety, depression, sadness to call office for evaluation as soon as symptoms arise even if before 6 week appointment. Monitor for excessive bleeding >1 pad/hr for several hours, dizziness, syncope, fever or changes in blood pressure.    Order Comments: Instructions: Discussed changes in postpartum mood-anxiety, depression, sadness to call office for evaluation as soon as symptoms arise even if before 6 week appointment. Monitor for excessive bleeding >1 pad/hr for several hours, dizziness, syncope, fever or changes in blood pressure.         Discharge Follow-up with Specified Provider: ; 6 Weeks   As directed      To:    Follow Up: 6 Weeks                Maria Isabel Lakhani, APRN  11/23/23  10:51 EST  Csd

## 2023-11-24 NOTE — PAYOR COMM NOTE
"Heather Garcia [place] (22 y.o. Female)     [facility] Auth# [id number]  Delivery Information:  Vaginal Delivery 11/21/23 @ 10:38  Wt 3695 gms  Male  Apgars 8/9    Discharged 11/23/23.    From:Jill Catherine LPN, Utilization Review  Phone #[phone]  Fax #[phone]        Date of Birth   2000    Social Security Number       Address   [address] [place] [address]    Home Phone   [phone]    MRN   [id number]       Shinto   Yarsanism    Marital Status   Single                            Admission Date   11/20/23    Admission Type   Elective    Admitting Provider   Sy Agee MD    Attending Provider       Department, Room/Bed   [facility] MOTHER BABY 4B, N429/1       Discharge Date   11/23/2023    Discharge Disposition   Home or Self Care    Discharge Destination                                 Attending Provider: (none)   Allergies: Amoxicillin-pot Clavulanate, Sulfa Antibiotics, Sulfamethoxazole-trimethoprim    Isolation: None   Infection: None   Code Status: Prior    Ht: 165.1 cm (65\")   Wt: 112 kg (248 lb)    Admission Cmt: None   Principal Problem: Pregnancy [Z34.90]                   Active Insurance as of 11/20/2023       Primary Coverage       Payor Plan Insurance Group Employer/Plan Group    [employer] [place] [employer] [place]ICA[place]        Payor Plan Address Payor Plan Phone Number Payor Plan Fax Number Effective Dates    [address] [address] [phone]  2/23/2021 - None Entered    [facility] [id number]         Subscriber Name Subscriber Birth Date Member ID       HEATHER GARCIA MONTANA 2000 [id number]                     Emergency Contacts        (Rel.) Home Phone Work Phone Mobile Phone    SY GARCIA (Significant Other) [phone] -- [phone]              Insurance Information                  [facility]/WELLCARE MEDICAID Phone: [phone]    Subscriber: Heather Garcia [place] Subscriber#: [id number]    Group#: -- Precert#: -- " "            History & Physical        Woodrow Agee MD at 23 0818          [facility]  Obstetric History and Physical    Referring Provider: Woodrow Agee MD      Chief Complaint   Patient presents with    Scheduled Induction       Subjective    Patient is a 22 y.o. female  currently at 39w1d, who presents for IOL .    Her prenatal care has been uneventful.  Her previous obstetric/gynecological history is noted for is remarkable for prior vaginal delivery at [facility] .    The following portions of the patients history were reviewed and updated as appropriate: current medications, allergies, past medical history, past surgical history, past family history, past social history, and problem list .       Prenatal Information:   Maternal Prenatal Labs  Blood Type ABO Type   Date Value Ref Range Status   2023 O  Final      Rh Status RH type   Date Value Ref Range Status   2023 Positive  Final      Antibody Screen Antibody Screen   Date Value Ref Range Status   2023 Negative  Final      Gonnorhea No results found for: \"GCCX\"   Chlamydia No results found for: \"CLAMYDCU\"   RPR No results found for: \"RPR\"   Syphilis Antibody No results found for: \"SYPHILIS\"   Rubella No results found for: \"RUBELLAIGGIN\"   Hepatitis B Surface Antigen No results found for: \"HEPBSAG\"   HIV-1 Antibody No results found for: \"LABHIV1\"   Hepatitis C Antibody No results found for: \"HEPCAB\"   Rapid Urin Drug Screen No results found for: \"AMPMETHU\", \"BARBITSCNUR\", \"LABBENZSCN\", \"LABMETHSCN\", \"LABOPIASCN\", \"THCURSCR\", \"COCAINEUR\", \"AMPHETSCREEN\", \"PROPOXSCN\", \"BUPRENORSCNU\", \"METAMPSCNUR\", \"OXYCODONESCN\", \"TRICYCLICSCN\"   Group B Strep Culture No results found for: \"GBSANTIGEN\"           External Prenatal Results       Pregnancy Outside Results - Transcribed From Office Records - See Scanned Records For Details       Test Value Date Time    ABO  O  23    Rh  Positive  23    Antibody " Screen  Negative  23       Negative  23       Negative  23 1405       Negative  23 1506    Varicella IgG       Rubella  1.43 index 23 1506    Hgb  12.0 g/dL 23       12.3 g/dL 23       12.9 g/dL 23 1405       13.4 g/dL 23        14.0 g/dL 23 1506    Hct  37.0 % 23       37.8 % 23       37.4 % 23 1405       40.1 % 23        41.2 % 23 1506    Glucose Fasting GTT       Glucose Tolerance Test 1 hour       Glucose Tolerance Test 3 hour       Gonorrhea (discrete)  Negative  23 1506    Chlamydia (discrete)  Negative  23 1506    RPR  Non Reactive  23 1506    VDRL       Syphilis Antibody       HBsAg  Negative  23 1506    Herpes Simplex Virus PCR       Herpes Simplex VIrus Culture       HIV  Non Reactive  23 1506    Hep C RNA Quant PCR       Hep C Antibody  Non Reactive  23 1506    AFP  21.0 ng/mL 23 1407    Group B Strep  Negative  10/30/23 1600    GBS Susceptibility to Clindamycin       GBS Susceptibility to Erythromycin       Fetal Fibronectin       Genetic Testing, Maternal Blood                 Drug Screening       Test Value Date Time    Urine Drug Screen       Amphetamine Screen  Negative ng/mL 23 1506    Barbiturate Screen  Negative ng/mL 23 1506    Benzodiazepine Screen  Negative ng/mL 23 1506    Methadone Screen  Negative ng/mL 23 1506    Phencyclidine Screen  Negative ng/mL 23 1506    Opiates Screen       THC Screen       Cocaine Screen       Propoxyphene Screen  Negative ng/mL 23 1506    Buprenorphine Screen       Methamphetamine Screen       Oxycodone Screen       Tricyclic Antidepressants Screen                 Legend    ^: Historical                              Past OB History:       OB History    Para Term  AB Living   2 1 1 0 0 1   SAB IAB Ectopic Molar Multiple Live Births   0 0 0 0 0 1      #  Outcome Date GA Lbr Meet/2nd Weight Sex Delivery Anes PTL Lv   2 Current            1 Term 08/08/22 37w0d  3402 g (7 lb 8 oz) M Vag-Vacuum EPI  BRIAN      Complications: Fourth degree laceration of perineum during delivery, postpartum      Obstetric Comments   Fob #1 - Pregnancy #1 and #2    Pregnancy #1 - 4th degree laceration with vacuum assist/ possible planning p c/s if needed        Past Medical History: Past Medical History:   Diagnosis Date    Endometriosis     History of gestational hypertension 2022    PCOS (polycystic ovarian syndrome)       Past Surgical History Past Surgical History:   Procedure Laterality Date    CHOLECYSTECTOMY      LAPAROSCOPY HYSTEROSCOPY ENDOMETRIAL ABLATION      TONSILLECTOMY      WISDOM TOOTH EXTRACTION        Family History: Family History   Problem Relation Age of Onset    Uterine cancer Mother         cervical cancer    Breast cancer Neg Hx     Ovarian cancer Neg Hx     Colon cancer Neg Hx       Social History:  reports that she has never smoked. She has never used smokeless tobacco.   reports no history of alcohol use.   reports no history of drug use.                   General ROS Negative Findings:Headaches, Visual Changes, Epigastric pain, Anorexia, Nausia/Vomiting, ROM, and Vaginal Bleeding    ROS     All other systems have been reviewed and are neg  Objective      Vital Signs Range for the last 24 hours  Temperature: Temp:  [98 °F (36.7 °C)-98.3 °F (36.8 °C)] 98.3 °F (36.8 °C)   Temp Source: Temp src: Oral   BP: BP: ()/(55-63) 133/63   Pulse: Heart Rate:  [73-98] 86   Respirations: Resp:  [16-18] 18   SPO2:     O2 Amount (l/min):     O2 Devices     Weight: Weight:  [112 kg (248 lb)] 112 kg (248 lb)     Physical Examination:   General:   alert, appears stated age, and cooperative   Skin:   normal   HEENT:     Lungs:   clear to auscultation bilaterally   Heart:   regular rate and rhythm, S1, S2 normal, no murmur, click, rub or gallop   Abdomen:  soft, non-tender; bowel  sounds normal; no masses,  no organomegaly   Lower Extremities    Pelvis:  Vulva and vagina appear normal. Bimanual exam reveals normal uterus and adnexa.         Presentation: vertex   Cervix: Exam by: Method: sterile exam per RN   Dilation:     Effacement: Cervical Effacement: 80%   Station:         Fetal Heart Rate Assessment   Method: Fetal HR Assessment Method: external   Beats/min: Fetal HR (beats/min): 120   Baseline: Fetal HR Baseline: normal range   Varibility: Fetal HR Variability: moderate (amplitude range 6 to 25 bpm)   Accels: Fetal HR Accelerations: greater than/equal to 15 bpm, lasting at least 15 seconds   Decels: Fetal HR Decelerations: absent   Tracing Category:       Uterine Assessment   Method: Method: external tocotransducer   Frequency (min): Contraction Frequency (Minutes): 1-3   Ctx Count in 10 min:     Duration:     Intensity: Contraction Intensity: mild by palpation   Intensity by IUPC:     Resting Tone: Uterine Resting Tone: soft by palpation   Resting Tone by IUPC:     [place] Units:       Laboratory Results:   Lab Results (last 24 hours)       Procedure Component Value Units Date/Time    CBC (No Diff) [[id number]]  (Normal) Collected: 11/20/23 1959    Specimen: Blood Updated: 11/20/23 2033     WBC 9.81 10*3/mm3      RBC 4.39 10*6/mm3      Hemoglobin 12.0 g/dL      Hematocrit 37.0 %      MCV 84.3 fL      MCH 27.3 pg      MCHC 32.4 g/dL      RDW 12.9 %      RDW-SD 39.3 fl      MPV 9.4 fL      Platelets 251 10*3/mm3           Radiology Review:   Imaging Results (Last 24 Hours)       ** No results found for the last 24 hours. **          Other Studies:    Assessment & Plan      Pregnancy        Assessment:  1.  Intrauterine pregnancy at 39w1d weeks gestation with reactive fetal status.    2.  GBS negative    Plan:  1. Vaginal anticipated  2. Plan of care has been reviewed with patient.  3.  Risks, benefits of treatment plan have been discussed.  4.  All questions have been  "answered.  5      Woodrow Agee MD  2023  08:18 EST    Electronically signed by Woodrow Agee MD at 23 0820          Operative/Procedure Notes (last 7 days)        Woodrow Agee MD at 23 1200          [facility]  Vaginal Delivery Note   Review the Delivery Report for details.       Delivery     Delivery: Vaginal, Spontaneous     YOB: 2022    Time of Birth:  Gestational Age 10:38 AM   39w1d     Anesthesia: Epidural     Delivering clinician: Woodrow Agee    Forceps?   No   Vacuum? No    Shoulder dystocia present: No        Delivery narrative:  The patient delivered a viable male infant by  from the NICOLE presentation over intact perineum. Suction performed at delivery and infant placed on maternal chest. Delayed cord clamping performed. Pitocin given IV and placenta delivered with gentle downward traction. A 1st degree perineal laceration repaired with 3.0 vicryl. Good hemostasis noted.     Infant    Findings: male  infant     Infant observations: Weight: 3695 g (8 lb 2.3 oz)   Length: 20  in  Observations/Comments:        Apgars: 8  @ 1 minute /    9  @ 5 minutes   Infant Name:      Placenta, Cord, and Fluid    Placenta delivered  Spontaneous  at   2023 10:41 AM     Cord: 3 vessels  present.   Nuchal Cord?  no   Cord blood obtained: Yes    Cord gases obtained:  No    Cord gas results: Venous:  No results found for: \"PHCVEN\", \"BECVEN\"    Arterial:  No results found for: \"PHCART\", \"BECART\"     Repair    Episiotomy: Not recorded     No    Lacerations: Yes  Laceration Information  Laceration Repaired?   Perineal:  1st  yes   Periurethral:       Labial:       Sulcus:       Vaginal:       Cervical:         Suture used for repair: 3-0 Vicryl  Laceration Length for 3rd or 4th degree lacerations: NA cm   Estimated Blood Loss:               Quantitative Blood Loss:                  Complications  none    Disposition  Mother to Mother Baby/Postpartum  " in stable condition currently.  Baby to [facility]  in stable condition currently.      Woodrow Agee MD  23  12:00 EST          Electronically signed by Woodrow Agee MD at 23 1202          Discharge Summary        Maria Isabel Lakhani APRN at 23 1051       Attestation signed by Woodrow Agee MD at 23 1203    I have reviewed this documentation and agree.                  Discharge Summary    Date of Admission: 2023  Date of Discharge:  2023      Patient: Heather Garcia      MR#:[id number]    Delivery Provider: Woodrow Agee     Discharge Surgeon/OB: Dr. Woodrow Agee/Maddie ZAIDI    Presenting Problem/History of Present Illness  Pregnancy [Z34.90]  Pregnancy [Z34.90]     Patient Active Problem List    Diagnosis     Third trimester pregnancy [Z34.93]     Normal labor [O80, Z37.9]     Maternal obesity affecting pregnancy, antepartum [O99.210]     Supervision of other normal pregnancy, antepartum [Z34.80]          Discharge Diagnosis: Vaginal delivery at 39w1d    Procedures:  Vaginal, Spontaneous     2023    10:38 AM        Discharge Date: 2023;     Hospital Course  Patient is a 22 y.o. female  at 39w1d status post vaginal delivery without complication. Postpartum the patient did well. She remained afebrile, with vital signs stable. She was ready for discharge on postpartum day 2.     Infant:   male  fetus 3695 g (8 lb 2.3 oz)  with Apgar scores of 8 , 9  at five minutes.    Condition on Discharge:  Stable    Vital Signs  Temp:  [98 °F (36.7 °C)-98.8 °F (37.1 °C)] 98.7 °F (37.1 °C)  Heart Rate:  [73-80] 73  Resp:  [16-18] 16  BP: (121-127)/(58-85) 121/85    Lab Results   Component Value Date    WBC 10.41 2023    HGB 10.3 (L) 2023    HCT 32.0 (L) 2023    MCV 85.8 2023     2023       Discharge Disposition  Home or Self Care    Discharge Medications     Discharge Medications        New Medications         Instructions Start Date   ibuprofen 600 MG tablet  Commonly known as: ADVIL,MOTRIN   600 mg, Oral, Every 6 Hours PRN             Continue These Medications        Instructions Start Date   cholecalciferol 25 MCG (1000 UT) tablet  Commonly known as: VITAMIN D3   1,000 Units, Oral, Daily      PRENATAL 1 PO   Oral             Stop These Medications      aspirin 81 MG chewable tablet              Discharge Diet:   Diet Instructions       Diet: Regular/House Diet; Regular Texture (IDDSI 7); Thin (IDDSI 0)      Discharge Diet: Regular/House Diet    Texture: Regular Texture (IDDSI 7)    Fluid Consistency: Thin (IDDSI 0)            Activity at Discharge:   Activity Instructions       Sexual Activity Restrictions      Type of Restriction:  Sex  Bathing       Explain Sexual Activity Restrictions: 6 wks    Bathing Restrictions: No Tub Bath            Follow-up Appointments  Future Appointments   Date Time Provider Department Center   1/5/2024 10:40 AM Woodrow Agee MD [facility]  [facility]     Additional Instructions for the Follow-ups that You Need to Schedule       Call MD With Problems / Concerns   As directed      Instructions: Discussed changes in postpartum mood-anxiety, depression, sadness to call office for evaluation as soon as symptoms arise even if before 6 week appointment. Monitor for excessive bleeding >1 pad/hr for several hours, dizziness, syncope, fever or changes in blood pressure.    Order Comments: Instructions: Discussed changes in postpartum mood-anxiety, depression, sadness to call office for evaluation as soon as symptoms arise even if before 6 week appointment. Monitor for excessive bleeding >1 pad/hr for several hours, dizziness, syncope, fever or changes in blood pressure.         Discharge Follow-up with Specified Provider: ; 6 Weeks   As directed      To:    Follow Up: 6 Weeks                Maria Isabel Lakhani, APRN  11/23/23  10:51 EST  [facility]          Electronically  signed by Woodrow Agee MD at 11/23/23 5109

## 2023-11-27 ENCOUNTER — TELEPHONE (OUTPATIENT)
Dept: OBSTETRICS AND GYNECOLOGY | Facility: CLINIC | Age: 23
End: 2023-11-27
Payer: COMMERCIAL

## 2023-11-27 NOTE — TELEPHONE ENCOUNTER
Returned patient's call. Had  23. Reports onset last night of urinary frequency and burning. States she had same symptoms with a UTI after her previous delivery. Denies any fever or other symptoms. Reports normal lochia. Patient lives out of town. Informed her she can be seen here or she can see her PCP for evaluation. Stated she will see her PCP. Advised her to call us back if evaluation is not consistent with UTI. She v/u and agreed.

## 2023-11-27 NOTE — TELEPHONE ENCOUNTER
Caller: Heather Garcia [place]     Best call back number: [phone]    What is your medical concern? PT HAD A BABY LAST WEEK AND NOW SHE THINKS SHE HAS A UTI. SHE IS HAVING BURNING WHEN SHE URINATES AND FEELS LIKE SHE HAS TO GO ALL THE TIME. SHE WANTS TO KNOW IF SHE NEEDS TO BE SEEN OR IF YOU CAN CALL HER IN SOMETHING FOR IT. IF YOU CALL IN SOMETHING SHE DOES USE THE [employer] IN [place]. PLEASE CALL AND LET HER KNOW. SHE CAN BE REACHED AT ANY TIME AND YOU CAN LVM IF NEEDED. **PT IS BREASTFEEDING AS WELL**    How long has this issue been going on? STARTED LAST NIGHT

## 2024-01-05 ENCOUNTER — POSTPARTUM VISIT (OUTPATIENT)
Dept: OBSTETRICS AND GYNECOLOGY | Facility: CLINIC | Age: 24
End: 2024-01-05
Payer: COMMERCIAL

## 2024-01-05 VITALS
WEIGHT: 215.2 LBS | SYSTOLIC BLOOD PRESSURE: 122 MMHG | HEIGHT: 65 IN | BODY MASS INDEX: 35.85 KG/M2 | DIASTOLIC BLOOD PRESSURE: 84 MMHG

## 2024-01-05 DIAGNOSIS — Z30.430 ENCOUNTER FOR IUD INSERTION: ICD-10-CM

## 2024-01-05 RX ORDER — PHENTERMINE HYDROCHLORIDE 37.5 MG/1
37.5 CAPSULE ORAL DAILY
COMMUNITY
Start: 2023-12-07

## 2024-01-05 RX ORDER — SERTRALINE HYDROCHLORIDE 25 MG/1
25 TABLET, FILM COATED ORAL DAILY
Qty: 30 TABLET | Refills: 5 | Status: SHIPPED | OUTPATIENT
Start: 2024-01-05

## 2024-01-05 NOTE — PROGRESS NOTES
"        Chief Complaint   Patient presents with    Postpartum Care     6 week       Postpartum Visit         Heather Garcia is a 23 y.o.  who presents today for a 6 week(s) postpartum check.       Vaginal, Spontaneous    Information for the patient's :  Khang Garcia [[id number]]   2023   male   Khang Garcia   3695 g (8 lb 2.3 oz)   Gestational Age: 39w1d        Baby Discharged: Discharged with Mom  Delivering Physician: Woodrow Agee MD    At the time of delivery were you diagnosed with any of the following: None. The laceration was 1st degree and is healing well. Patient describes vaginal bleeding as absent. She reports starting her first period post delivery on 24.  Patient is bottle feeding.  She desires contraceptive methods: IUD  for contraception.  Patient reports bowel issues- bowel incontinence. She requests a referral for pelvic floor therapy \"PT pros\".      Patient reports postpartum depression. Postpartum Depression Screening Questionnaire: 12, yes treatment indicated. She reports postpartum anxiety feelings of doom. Her infant had lip, tongue, and cheek tie revisions done on 2024. The infant has struggled eating because of the ties. All of this has increased her anxiety. She is open to discussing meds for postpartum anxiety.      Last Pap : Never done  Last Completed Pap Smear       This patient has no relevant Health Maintenance data.              The additional following portions of the patient's history were reviewed and updated as appropriate: allergies, current medications, past family history, past medical history, past social history, past surgical history, and problem list.    Review of Systems   Constitutional: Negative.    HENT: Negative.     Eyes: Negative.    Respiratory: Negative.     Cardiovascular: Negative.    Gastrointestinal: Negative.    Endocrine: Negative.    Genitourinary: Negative.    Musculoskeletal: Negative.    Skin: Negative.  " "  Allergic/Immunologic: Negative.    Neurological: Negative.    Hematological: Negative.    Psychiatric/Behavioral:  The patient is nervous/anxious.      All other systems reviewed and are negative.     I have reviewed and agree with the HPI, ROS, and historical information as entered above. Woodrow Agee MD      /84   Ht 165.1 cm (65\")   Wt 97.6 kg (215 lb 3.2 oz)   LMP 02/21/2023 (Exact Date)   Breastfeeding No   BMI 35.81 kg/m²     Physical Exam  Vitals reviewed. Exam conducted with a chaperone present.   Constitutional:       Appearance: Normal appearance. She is normal weight.   HENT:      Head: Normocephalic and atraumatic.   Abdominal:      General: Abdomen is flat. Bowel sounds are normal.      Palpations: Abdomen is soft.   Genitourinary:     General: Normal vulva.      Vagina: Normal.      Cervix: Normal.      Uterus: Normal.       Adnexa: Right adnexa normal and left adnexa normal.      Comments: See IUD insertion note  Skin:     General: Skin is warm and dry.   Neurological:      Mental Status: She is alert and oriented to person, place, and time.   Psychiatric:         Mood and Affect: Mood normal.         Behavior: Behavior normal.             Assessment and Plan    Problem List Items Addressed This Visit    None  Visit Diagnoses       Postpartum examination following vaginal delivery    -  Primary    Relevant Orders    Ambulatory Referral to Physical Therapy Pelvic Floor (Completed)    Encounter for IUD insertion                S/p Vaginal delivery, 6 week(s) postpartum.  Doing well.    Return to normal physical activity.  No pelvic restrictions.   Baby doing well.  Bottlefeeding going well.  No si/sx of postpartum depression  Contraception: contraceptive methods: IUD.  Insertion date: today  Return in about 6 weeks (around 2/16/2024) for Annual physical and Pap and IUD check.     Woodrow Agee MD  01/05/2024         Gynecologic Procedure Note        Procedure: IUD Insertion " "    Procedures    Pre procedure indication 1) Desires Mirena  Post procedure indication 1) Desires Mirena    NDC: Mirena 77010-348-71  Lot #: [id number]  Exp Date: 01/01/2026  BH device    /84   Ht 165.1 cm (65\")   Wt 97.6 kg (215 lb 3.2 oz)   LMP 02/21/2023 (Exact Date)   Breastfeeding No   BMI 35.81 kg/m²       The risks, benefits, and alternatives to Mirena were explained at length with the patient. All her questions were answered and consents were signed.  Her LMP was 2/21/2023 / patient recently had delivery .  Urine Pregnancy Test was Negative.  Patient does not have an allergy to betadine or shellfish.    Time out: immediate members of the procedure team and patient agree to the following: correct patient, correct site, correct procedure to be performed. Woodrow Agee MD      The patient was placed in a dorsal lithotomy position on the examining table in [facility]. A bimanual exam confirmed the uterus was midposition. A speculum was inserted into the vagina and the cervix was brought into view.  The cervix was prepped with Betadine. The anterior lip of the cervix was then grasped with a single-tooth tenaculum. The endometrial cavity was then sounded to 8 centimeters. The sealed Mirena package was opened and the IUD was removed in a sterile fashion.    The upper edge of the depth setting the flange was set at the uterine sound measurement. The  was then carefully advanced to the cervical canal into the uterus to the level of the fundus.  The slider was then retracted about 1 cm and deployed the device. The device was then gently advanced to the fundus. The IUD was then released by pulling the slider down all the way. The  was removed carefully from the uterus. The threads were then cut leaving 2-3 cm visible outside of the cervix.  The single-tooth tenaculum was removed from the anterior lip. Good hemostasis was noted.   All other instruments were removed from the vagina. "       The patient tolerated the procedure very well with a mild amount of discomfort.  She was monitored for 10 minutes prior to discharge.      There were no complications.    The patient was counseled about the need to return in 4 weeks for IUD check.     She was counseled about the need to use a backup method of contraception such as condoms until her post insertion exam was performed. The patient verbalized understanding when the IUD will need to be removed/replaced. Written information was given to the patient.  The patient is counseled to contact us if she has any significant or increasing bleeding, pain, fever, chills, or other concerns. She is instructed to see a doctor right away if she believes that she may be pregnant at any time with the IUD in place.    Woodrow Agee MD  01/05/2024

## 2024-02-09 ENCOUNTER — TELEPHONE (OUTPATIENT)
Dept: OBSTETRICS AND GYNECOLOGY | Facility: CLINIC | Age: 24
End: 2024-02-09

## 2024-02-09 NOTE — TELEPHONE ENCOUNTER
Caller: Heather Garcia    Relationship to patient: Self    Best call back number: [phone]    Type of visit: GYN F/U    If rescheduling, when is the original appointment: 2/9/24     Additional notes:PT R/S TODAYS APPT DUE TO RUNNING LATE AND SHE LIVES AN HOUR AND A HALF AWAY SHE DID R/S FOR 2/27/24

## 2024-02-27 ENCOUNTER — OFFICE VISIT (OUTPATIENT)
Dept: OBSTETRICS AND GYNECOLOGY | Facility: CLINIC | Age: 24
End: 2024-02-27
Payer: COMMERCIAL

## 2024-02-27 VITALS
WEIGHT: 199.8 LBS | DIASTOLIC BLOOD PRESSURE: 76 MMHG | SYSTOLIC BLOOD PRESSURE: 118 MMHG | BODY MASS INDEX: 33.29 KG/M2 | HEIGHT: 65 IN

## 2024-02-27 DIAGNOSIS — Z01.419 ENCOUNTER FOR ANNUAL ROUTINE GYNECOLOGICAL EXAMINATION: Primary | ICD-10-CM

## 2024-02-27 RX ORDER — TIRZEPATIDE 5 MG/.5ML
INJECTION, SOLUTION SUBCUTANEOUS
COMMUNITY
Start: 2024-02-06

## 2024-02-27 NOTE — PROGRESS NOTES
Gynecologic Annual Exam Note        Annual Exam and IUD Check        Subjective     HPI  Heather Garcia is a 23 y.o.  female who presents for annual well woman exam and IUD follow up check as a established patient. There were no changes to her medical or surgical history since her last visit.. No LMP recorded.  Her periods have been irregular due to recent delivery and recent IUD insertion.  She denies dysmenorrhea. Marital Status: single.  She is sexually active. She has not had new partners.. STD testing recommendations have been explained to the patient and she does not desire STD testing.    The patient would like to discuss the following complaints today: None    Patient denies any issues with Mirena IUD since insertion.    Additional OB/GYN History   contraceptive methods: IUD.  Insertion date: 2024  Desires to: continue contraception  Thromboembolic Disease: none  History of migraines: no  Age of menarche: 13    History of STD: no    Last Pap : 2023 Results: negative.   Last Completed Pap Smear       This patient has no relevant Health Maintenance data.             History of abnormal Pap smear: no  Gardasil status:completed  Family history of uterine, colon, breast, or ovarian cancer: yes - Mother had uterine cancer  Performs monthly Self-Breast Exam: no  Exercises Regularly:no  Feelings of Anxiety or Depression: yes - anxiety  Tobacco Usage?: No       Current Outpatient Medications:     Mounjaro 5 MG/0.5ML solution pen-injector pen, , Disp: , Rfl:     sertraline (Zoloft) 25 MG tablet, Take 1 tablet by mouth Daily., Disp: 30 tablet, Rfl: 5    sertraline (Zoloft) 50 MG tablet, Take 1 tablet by mouth Daily., Disp: 30 tablet, Rfl: 10     Patient denies the need for medication refills today.    OB History          2    Para   2    Term   2       0    AB   0    Living   2         SAB   0    IAB   0    Ectopic   0    Molar   0    Multiple   0    Live Births   2           "Obstetric Comments   Fob #1 - Pregnancy #1 and #2   Pregnancy #1 - 4th degree laceration with vacuum assist/ possible planning p c/s if needed                Health Maintenance   Topic Date Due    Annual Gynecologic Pelvic and Breast Exam  Never done    COVID-19 Vaccine (1) Never done    HPV VACCINES (1 - 2-dose series) Never done    ANNUAL PHYSICAL  Never done    PAP SMEAR  Never done    CHLAMYDIA SCREENING  05/24/2024    BMI FOLLOWUP  01/05/2025    TDAP/TD VACCINES (3 - Td or Tdap) 08/30/2033    HEPATITIS C SCREENING  Completed    INFLUENZA VACCINE  Completed    MENINGOCOCCAL VACCINE  Completed    Pneumococcal Vaccine 0-64  Aged Out       Past Medical History:   Diagnosis Date    Endometriosis     Gestational hypertension 34 weeks    History of gestational hypertension 2022    Kidney stone     PCOS (polycystic ovarian syndrome)     Polycystic ovary syndrome     PONV (postoperative nausea and vomiting)         Past Surgical History:   Procedure Laterality Date    CHOLECYSTECTOMY      ENDOMETRIAL ABLATION      LAPAROSCOPIC CHOLECYSTECTOMY  02/26/18    LAPAROSCOPY HYSTEROSCOPY ENDOMETRIAL ABLATION      TONSILLECTOMY      WISDOM TOOTH EXTRACTION         The additional following portions of the patient's history were reviewed and updated as appropriate: allergies, current medications, past family history, past medical history, past social history, past surgical history, and problem list.    Review of Systems   Constitutional: Negative.    Respiratory: Negative.     Cardiovascular: Negative.    Gastrointestinal: Negative.    Genitourinary: Negative.    Musculoskeletal: Negative.    Neurological: Negative.    Psychiatric/Behavioral: Negative.           I have reviewed and agree with the HPI, ROS, and historical information as entered above. Woodrow Agee MD          Objective   /76   Ht 165.1 cm (65\")   Wt 90.6 kg (199 lb 12.8 oz)   Breastfeeding No   BMI 33.25 kg/m²     Physical Exam  Vitals reviewed. " Exam conducted with a chaperone present.   Constitutional:       Appearance: Normal appearance.   HENT:      Head: Normocephalic and atraumatic.   Cardiovascular:      Rate and Rhythm: Normal rate and regular rhythm.   Pulmonary:      Effort: Pulmonary effort is normal.      Breath sounds: Normal breath sounds.   Chest:   Breasts:     Right: Normal.      Left: Normal.   Abdominal:      General: Abdomen is flat. Bowel sounds are normal.      Palpations: Abdomen is soft.   Genitourinary:     General: Normal vulva.      Vagina: Normal.      Cervix: Normal.      Uterus: Normal.       Adnexa: Right adnexa normal and left adnexa normal.            Comments: IUD strings in place  Musculoskeletal:      Cervical back: Neck supple.   Skin:     General: Skin is warm and dry.   Neurological:      Mental Status: She is alert and oriented to person, place, and time.   Psychiatric:         Behavior: Behavior normal.            Assessment and Plan    Problem List Items Addressed This Visit    None  Visit Diagnoses       Encounter for annual routine gynecological examination    -  Primary    Relevant Orders    LIQUID-BASED PAP SMEAR WITH HPV GENOTYPING IF ASCUS ([facility])            GYN annual well woman exam.   Reviewed pap guidelines.   Encouraged use of condoms for STD prevention.  Reviewed monthly self breast exams.  Instructed to call with lumps, pain, or breast discharge.    Reviewed exercise as a preventative health measures.   Reccommended Flu Vaccine in Fall of each year.  RTC in 1 year or PRN with problems  Return in about 1 year (around 2/27/2025) for Annual physical.    Woodrow Agee MD  02/27/2024

## 2024-03-01 LAB — REF LAB TEST METHOD: NORMAL

## 2024-06-24 ENCOUNTER — OFFICE VISIT (OUTPATIENT)
Dept: OBSTETRICS AND GYNECOLOGY | Facility: CLINIC | Age: 24
End: 2024-06-24
Payer: COMMERCIAL

## 2024-06-24 VITALS
HEIGHT: 65 IN | WEIGHT: 175 LBS | DIASTOLIC BLOOD PRESSURE: 78 MMHG | SYSTOLIC BLOOD PRESSURE: 102 MMHG | RESPIRATION RATE: 18 BRPM | BODY MASS INDEX: 29.16 KG/M2

## 2024-06-24 DIAGNOSIS — R68.82 DECREASED LIBIDO: ICD-10-CM

## 2024-06-24 DIAGNOSIS — Z30.432 ENCOUNTER FOR IUD REMOVAL: Primary | ICD-10-CM

## 2024-06-24 PROCEDURE — 99213 OFFICE O/P EST LOW 20 MIN: CPT | Performed by: OBSTETRICS & GYNECOLOGY

## 2024-06-24 PROCEDURE — 58301 REMOVE INTRAUTERINE DEVICE: CPT | Performed by: OBSTETRICS & GYNECOLOGY

## 2024-06-24 RX ORDER — TIRZEPATIDE 7.5 MG/.5ML
7.5 INJECTION, SOLUTION SUBCUTANEOUS
COMMUNITY

## 2024-06-24 NOTE — PROGRESS NOTES
Chief Complaint   Patient presents with    hormones     Hair loss and low libido       Subjective   HPI  Heather Garcia is a 23 y.o. female, . Her last LMP was Patient's last menstrual period was 06/10/2024 (within days).. who presents for a discussion on hormones. Patient states since she has had her IUD placed in January she has experienced hair loss and low libido. Patient would like to see if her IUD or her weight loss medication is causing these symptoms. Patient would also like her annual blood work to be completed today.    Discussed removal and proceeding with estrogen containing form of contraception.       Additional OB/GYN History     Last Pap :   Last Completed Pap Smear            PAP SMEAR (Yearly) Next due on 2024  LIQUID-BASED PAP SMEAR WITH HPV GENOTYPING IF ASCUS ([facility])                    Last mammogram:   Last Completed Mammogram       This patient has no relevant Health Maintenance data.            Tobacco Usage?: No   OB History          2    Para   2    Term   2       0    AB   0    Living   2         SAB   0    IAB   0    Ectopic   0    Molar   0    Multiple   0    Live Births   2          Obstetric Comments   Fob #1 - Pregnancy #1 and #2   Pregnancy #1 - 4th degree laceration with vacuum assist/ possible planning p c/s if needed                  Current Outpatient Medications:     Mounjaro 7.5 MG/0.5ML solution pen-injector pen, 0.5 mL., Disp: , Rfl:     sertraline (Zoloft) 50 MG tablet, Take 1 tablet by mouth Daily., Disp: 30 tablet, Rfl: 10     Past Medical History:   Diagnosis Date    Endometriosis     Gestational hypertension 34 weeks    History of gestational hypertension     Kidney stone     PCOS (polycystic ovarian syndrome)     Polycystic ovary syndrome     PONV (postoperative nausea and vomiting)         Past Surgical History:   Procedure Laterality Date    CHOLECYSTECTOMY      ENDOMETRIAL ABLATION       "LAPAROSCOPIC CHOLECYSTECTOMY  02/26/18    LAPAROSCOPY HYSTEROSCOPY ENDOMETRIAL ABLATION      TONSILLECTOMY      WISDOM TOOTH EXTRACTION         The additional following portions of the patient's history were reviewed and updated as appropriate: allergies, current medications, past family history, past medical history, past social history, past surgical history, and problem list.    Review of Systems   Constitutional: Negative.    Respiratory: Negative.     Cardiovascular: Negative.    Gastrointestinal: Negative.    Genitourinary:  Positive for decreased libido and menstrual problem. Negative for breast discharge, breast lump and breast pain.   Musculoskeletal: Negative.    Neurological: Negative.    Psychiatric/Behavioral: Negative.         I have reviewed and agree with the HPI, ROS, and historical information as entered above. Woodrow Agee MD      Objective   /78   Resp 18   Ht 165.1 cm (65\")   Wt 79.4 kg (175 lb)   LMP 06/10/2024 (Within Days)   BMI 29.12 kg/m²     Physical Exam  Vitals reviewed.   Constitutional:       Appearance: Normal appearance.   HENT:      Head: Normocephalic and atraumatic.   Abdominal:      General: Abdomen is flat.      Palpations: Abdomen is soft.   Skin:     General: Skin is warm and dry.   Neurological:      Mental Status: She is alert and oriented to person, place, and time.   Psychiatric:         Mood and Affect: Mood normal.         Behavior: Behavior normal.     IUD Removal Procedure Note    Procedures    Type of IUD:  Mirena   Date of insertion:  1 year ago  Reason for removal:  Side effect: decreased libido and hair loss    Procedure Time Out Documentation    Procedure Details  IUD strings visible:  yes  Removal:  IUD strings grasped and IUD removed intact with gentle traction.  The patient tolerated the procedure well.    All appropriate instructions regarding removal were reviewed.    Tolerated well  No apparent complications  Post procedure diagnosis : " IUD removal     Plans for contraception:  [facility]    The patient was advised to call for any fever or for prolonged or severe pain or bleeding. She was advised to use motrin as needed for mild to moderate pain.     Woodrow Agee MD  06/24/2024     Assessment & Plan     Assessment     Problem List Items Addressed This Visit    None  Visit Diagnoses       Encounter for IUD removal    -  Primary    Decreased libido                IUD removal   Decreased libido    Plan     IUD removed without complications.   Samples of Twirla given today and will reevaluate in 2 months.   Return in about 9 weeks (around 8/26/2024) for GYN visit.        Woodrow Agee MD  06/24/2024

## 2024-08-14 ENCOUNTER — TELEPHONE (OUTPATIENT)
Dept: OBSTETRICS AND GYNECOLOGY | Facility: CLINIC | Age: 24
End: 2024-08-14
Payer: COMMERCIAL

## 2024-08-14 NOTE — TELEPHONE ENCOUNTER
Caller: IDALIA    Relationship:  WITH [facility]     Best call back number: DIRECT PHONE NUMBER [phone]  -OK TO LEAVE A VM AND CAN CALL ANYTIME       Who are you requesting to speak with (clinical staff, provider,  specific staff member): CLINICAL STAFF OR PROVIDER      What was the call regarding: IDALIA SENT OVER A MEDICAL RECORDS REQUEST ALONG WITH A MEDICAL CLEARANCE FORM FOR THE PROVIDER TO FILL OUT. IDALIA WAS DOUBLE CHECKING THAT WE DID RECEIVE THE REQUESTS. SHE USED THE FAX NUMBER [phone].

## 2025-03-06 DIAGNOSIS — R15.9 INCONTINENCE OF FECES, UNSPECIFIED FECAL INCONTINENCE TYPE: Primary | ICD-10-CM

## 2025-04-25 ENCOUNTER — OFFICE VISIT (OUTPATIENT)
Dept: GASTROENTEROLOGY | Facility: CLINIC | Age: 25
End: 2025-04-25
Payer: COMMERCIAL

## 2025-04-25 VITALS
SYSTOLIC BLOOD PRESSURE: 103 MMHG | BODY MASS INDEX: 29.92 KG/M2 | HEIGHT: 65 IN | DIASTOLIC BLOOD PRESSURE: 44 MMHG | TEMPERATURE: 97.1 F | HEART RATE: 74 BPM | WEIGHT: 179.6 LBS

## 2025-04-25 DIAGNOSIS — R19.7 DIARRHEA DUE TO MALABSORPTION: Primary | ICD-10-CM

## 2025-04-25 DIAGNOSIS — R15.9 FULL INCONTINENCE OF FECES: ICD-10-CM

## 2025-04-25 DIAGNOSIS — K90.9 DIARRHEA DUE TO MALABSORPTION: Primary | ICD-10-CM

## 2025-04-25 RX ORDER — LISDEXAMFETAMINE DIMESYLATE 40 MG/1
CAPSULE ORAL EVERY 24 HOURS
COMMUNITY
Start: 2025-03-31

## 2025-04-25 RX ORDER — COLESTIPOL HYDROCHLORIDE 1 G/1
1 TABLET ORAL 2 TIMES DAILY
Qty: 60 TABLET | Refills: 11 | Status: SHIPPED | OUTPATIENT
Start: 2025-04-25

## 2025-04-25 RX ORDER — HYDROXYZINE HYDROCHLORIDE 25 MG/1
TABLET, FILM COATED ORAL EVERY 24 HOURS
COMMUNITY

## 2025-04-25 RX ORDER — ESCITALOPRAM OXALATE 20 MG/1
TABLET ORAL EVERY 24 HOURS
COMMUNITY

## 2025-04-25 RX ORDER — TIRZEPATIDE 2.5 MG/.5ML
INJECTION, SOLUTION SUBCUTANEOUS
COMMUNITY
Start: 2025-03-26 | End: 2025-04-25

## 2025-04-25 NOTE — PATIENT INSTRUCTIONS
Fibercon is a great supplement if you can't eat 25-30 grams a day of dietary fiber. It does not cause bloating.

## 2025-04-25 NOTE — PROGRESS NOTES
Office Note     Name: Heather Garcia    : 2000     MRN: [id number]     Chief Complaint  Fecal Incontinence    Subjective     History of Present Illness:  History of Present Illness  The patient is a 24-year-old female who presents today for management of fecal incontinence, which has been present since a vaginal tear during childbirth in .    She reports experiencing difficulty with all types of stool, particularly softer ones. Loose and frequent stools and bloating are experienced since her cholecystectomy at age 17, often occurring after meals. No history of bowel obstruction, celiac disease, Crohn's disease, or ulcerative colitis is reported. Additionally, she reports no rectal bleeding, nausea, vomiting, heartburn, reflux, rectal pain, or abdominal pain. Pelvic floor therapy was undertaken throughout her pregnancy, providing some relief, but incontinence remains.       PAST SURGICAL HISTORY:  Cholecystectomy        FAMILY HISTORY  - Mother: Bowel obstructions, IBS  - Grandmother: Crohn's disease, colostomy bag (later reversed)    Past Medical History:   Past Medical History:   Diagnosis Date    Endometriosis     Gestational hypertension 34 weeks    History of gestational hypertension     Kidney stone     PCOS (polycystic ovarian syndrome)     Polycystic ovary syndrome     PONV (postoperative nausea and vomiting)        Past Surgical History:   Past Surgical History:   Procedure Laterality Date    CHOLECYSTECTOMY      ENDOMETRIAL ABLATION      LAPAROSCOPIC CHOLECYSTECTOMY  18    LAPAROSCOPY HYSTEROSCOPY ENDOMETRIAL ABLATION      TONSILLECTOMY      WISDOM TOOTH EXTRACTION         Immunizations:   Immunization History   Administered Date(s) Administered    Fluzone (or Fluarix & Flulaval for VFC) >6mos 10/11/2023    Tdap 2023        Medications:     Current Outpatient Medications:     Tirzepatide-Weight Management (Zepbound) 2.5 MG/0.5ML solution auto-injector, 0.5 mL  "Subcutaneous once weekly for 30 days, Disp: , Rfl:     Vyvanse 40 MG capsule, Daily, Disp: , Rfl:     escitalopram (LEXAPRO) 20 MG tablet, Daily., Disp: , Rfl:     hydrOXYzine (ATARAX) 25 MG tablet, Daily., Disp: , Rfl:     Allergies:   Allergies   Allergen Reactions    Amoxicillin-Pot Clavulanate Anaphylaxis    Sulfa Antibiotics Rash    Sulfamethoxazole-Trimethoprim Rash       Family History:   Family History   Problem Relation Age of Onset    Uterine cancer Mother         cervical cancer    Breast cancer Neg Hx     Ovarian cancer Neg Hx     Colon cancer Neg Hx        Social History:   Social History     Socioeconomic History    Marital status: Single   Tobacco Use    Smoking status: Never    Smokeless tobacco: Never   Vaping Use    Vaping status: Never Used   Substance and Sexual Activity    Alcohol use: Never    Drug use: Never    Sexual activity: Yes     Partners: Male     Birth control/protection: I.U.D.         Objective     Vital Signs  /44 (BP Location: Right arm, Patient Position: Sitting, Cuff Size: Adult)   Pulse 74   Temp 97.1 °F (36.2 °C) (Temporal)   Ht 165.1 cm (65\")   Wt 81.5 kg (179 lb 9.6 oz)   BMI 29.89 kg/m²   Estimated body mass index is 29.89 kg/m² as calculated from the following:    Height as of this encounter: 165.1 cm (65\").    Weight as of this encounter: 81.5 kg (179 lb 9.6 oz).          Physical Exam  Vitals reviewed.   Constitutional:       General: She is awake.   Cardiovascular:      Rate and Rhythm: Normal rate.   Pulmonary:      Effort: Pulmonary effort is normal.   Abdominal:      Tenderness: There is no abdominal tenderness.   Skin:     General: Skin is warm and dry.   Neurological:      Mental Status: She is alert.        Physical Exam  Abdomen: No abdominal pain reported.    Results        Assessment and Plan     Assessment & Plan  Diarrhea due to malabsorption    Orders:    colestipol (COLESTID) 1 g tablet; Take 1 tablet by mouth 2 (Two) Times a Day.    Full " incontinence of feces    Orders:    Ambulatory Referral to Colorectal Surgery       Assessment & Plan  1. Fecal incontinence:  - Patient requests referral for consideration of surgery to correct fecal incontinence, as she's had no significant improvement with pelvic floor therapy. Referral to colorectal surgery for further evaluation and management.  - Prescribe colestipol 1 tablet twice daily for presumed bile salt diarrhea, which may improve incontinence as well.  - Separate colestipol from other medications by at least two hours on either side.   - Maintain adequate fiber intake in the diet, ideally 25-30 grams daily.  - Contact the clinic for further evaluation if no improvement in symptoms.        Follow Up  As needed    Patient or patient representative verbalized consent for the use of Ambient Listening during the visit with  DEJUAN Baron for chart documentation. 4/25/2025  10:31 EDT    DEJUAN Baron  [facility] [address] [place] [address] GASTROENTEROLOGY  [address] [place] [id number]

## 2025-04-28 ENCOUNTER — TELEPHONE (OUTPATIENT)
Dept: GASTROENTEROLOGY | Facility: CLINIC | Age: 25
End: 2025-04-28
Payer: COMMERCIAL

## 2025-04-29 ENCOUNTER — PATIENT ROUNDING (BHMG ONLY) (OUTPATIENT)
Dept: GASTROENTEROLOGY | Facility: CLINIC | Age: 25
End: 2025-04-29
Payer: COMMERCIAL

## 2025-04-29 ENCOUNTER — TELEPHONE (OUTPATIENT)
Dept: GASTROENTEROLOGY | Facility: CLINIC | Age: 25
End: 2025-04-29
Payer: COMMERCIAL

## 2025-04-29 NOTE — PROGRESS NOTES
April 29, 2025    Hello, may I speak with Heather Garcia?    My name is DOLORES     I am  with [facility] [address]  [facility]  [address] [address].    Before we get started may I verify your date of birth? 2000    I am calling to officially welcome you to our practice and ask about your recent visit. Is this a good time to talk? yes    Tell me about your visit with us. What things went well?  WELL, WE ACTUALLY DIDN'T DO MUCH BECAUSE I WAS REFERRED TO SOMEONE ELSE.  I AM WAITING ON  TO CALL ME.        We're always looking for ways to make our patients' experiences even better. Do you have recommendations on ways we may improve?  no    Overall were you satisfied with your first visit to our practice? yes       I appreciate you taking the time to speak with me today. Is there anything else I can do for you? no      Thank you, and have a great day.

## 2025-04-29 NOTE — TELEPHONE ENCOUNTER
I called  and patient is scheduled with  [facility] for October 13 at 4 pm located at [address] [facility]. Patient notified and records faxed to  today.

## 2025-04-29 NOTE — TELEPHONE ENCOUNTER
PATIENT STATES THAT SHE HAS SPOKEN TO SOMEONE AT [facility] IN REGARDS TO OUTGOING REFERRAL.  PATIENT STATES [facility] THAT OUR OFFICE CALLS TO SPEAK TO SOMEONE DIRECTLY TO PROCESS REFERRAL RATHER THAN FAX.  PATIENT IS REQUESTING A CALL TO  [phone].

## 2025-08-14 ENCOUNTER — TELEPHONE (OUTPATIENT)
Dept: OBSTETRICS AND GYNECOLOGY | Facility: CLINIC | Age: 25
End: 2025-08-14
Payer: COMMERCIAL

## 2025-08-14 ENCOUNTER — HOSPITAL ENCOUNTER (EMERGENCY)
Facility: HOSPITAL | Age: 25
Discharge: HOME | End: 2025-08-14
Attending: FAMILY MEDICINE
Payer: COMMERCIAL

## 2025-08-14 ENCOUNTER — ANCILLARY PROCEDURE (OUTPATIENT)
Facility: HOSPITAL | Age: 25
End: 2025-08-14
Payer: COMMERCIAL

## 2025-08-14 VITALS
WEIGHT: 196.21 LBS | BODY MASS INDEX: 32.69 KG/M2 | OXYGEN SATURATION: 100 % | SYSTOLIC BLOOD PRESSURE: 109 MMHG | TEMPERATURE: 98 F | RESPIRATION RATE: 18 BRPM | DIASTOLIC BLOOD PRESSURE: 66 MMHG | HEIGHT: 65 IN | HEART RATE: 75 BPM

## 2025-08-14 DIAGNOSIS — N30.01 ACUTE CYSTITIS WITH HEMATURIA: Primary | ICD-10-CM

## 2025-08-14 LAB
ALBUMIN SERPL-MCNC: 4.2 G/DL (ref 3.5–5.2)
ALBUMIN/GLOB SERPL: 1.5 G/DL
ALP SERPL-CCNC: 59 U/L (ref 39–117)
ALT SERPL W P-5'-P-CCNC: 8 U/L (ref 1–33)
ANION GAP SERPL CALCULATED.3IONS-SCNC: 11.1 MMOL/L (ref 5–15)
AST SERPL-CCNC: 19 U/L (ref 1–32)
BACTERIA UR QL AUTO: ABNORMAL /HPF
BASOPHILS # BLD AUTO: 0.03 10*3/MM3 (ref 0–0.2)
BASOPHILS NFR BLD AUTO: 0.4 % (ref 0–1.5)
BILIRUB SERPL-MCNC: 0.2 MG/DL (ref 0–1.2)
BILIRUB UR QL STRIP: NEGATIVE
BUN SERPL-MCNC: 13.9 MG/DL (ref 6–20)
BUN/CREAT SERPL: 22.8 (ref 7–25)
CALCIUM SPEC-SCNC: 10 MG/DL (ref 8.6–10.5)
CHLORIDE SERPL-SCNC: 104 MMOL/L (ref 98–107)
CLARITY UR: ABNORMAL
CO2 SERPL-SCNC: 22.9 MMOL/L (ref 22–29)
COLOR UR: YELLOW
CREAT SERPL-MCNC: 0.61 MG/DL (ref 0.57–1)
DEPRECATED RDW RBC AUTO: 42.9 FL (ref 37–54)
EGFRCR SERPLBLD CKD-EPI 2021: 129 ML/MIN/1.73
EOSINOPHIL # BLD AUTO: 0.06 10*3/MM3 (ref 0–0.4)
EOSINOPHIL NFR BLD AUTO: 0.7 % (ref 0.3–6.2)
ERYTHROCYTE [DISTWIDTH] IN BLOOD BY AUTOMATED COUNT: 12.9 % (ref 12.3–15.4)
GLOBULIN UR ELPH-MCNC: 2.8 GM/DL
GLUCOSE SERPL-MCNC: 81 MG/DL (ref 65–99)
GLUCOSE UR STRIP-MCNC: NEGATIVE MG/DL
HCG INTACT+B SERPL-ACNC: NORMAL MIU/ML
HCT VFR BLD AUTO: 42.1 % (ref 34–46.6)
HGB BLD-MCNC: 13.9 G/DL (ref 12–15.9)
HGB UR QL STRIP.AUTO: NEGATIVE
HOLD SPECIMEN: NORMAL
HYALINE CASTS UR QL AUTO: ABNORMAL /LPF
IMM GRANULOCYTES # BLD AUTO: 0.01 10*3/MM3 (ref 0–0.05)
IMM GRANULOCYTES NFR BLD AUTO: 0.1 % (ref 0–0.5)
KETONES UR QL STRIP: ABNORMAL
LEUKOCYTE ESTERASE UR QL STRIP.AUTO: ABNORMAL
LIPASE SERPL-CCNC: 40 U/L (ref 13–60)
LYMPHOCYTES # BLD AUTO: 3.25 10*3/MM3 (ref 0.7–3.1)
LYMPHOCYTES NFR BLD AUTO: 39.3 % (ref 19.6–45.3)
MCH RBC QN AUTO: 29.9 PG (ref 26.6–33)
MCHC RBC AUTO-ENTMCNC: 33 G/DL (ref 31.5–35.7)
MCV RBC AUTO: 90.5 FL (ref 79–97)
MONOCYTES # BLD AUTO: 0.64 10*3/MM3 (ref 0.1–0.9)
MONOCYTES NFR BLD AUTO: 7.7 % (ref 5–12)
MUCOUS THREADS URNS QL MICRO: ABNORMAL /HPF
NEUTROPHILS NFR BLD AUTO: 4.29 10*3/MM3 (ref 1.7–7)
NEUTROPHILS NFR BLD AUTO: 51.8 % (ref 42.7–76)
NITRITE UR QL STRIP: NEGATIVE
PH UR STRIP.AUTO: 5.5 [PH] (ref 5–8)
PLATELET # BLD AUTO: 255 10*3/MM3 (ref 140–450)
PMV BLD AUTO: 9.7 FL (ref 6–12)
POTASSIUM SERPL-SCNC: 3.8 MMOL/L (ref 3.5–5.2)
PROT SERPL-MCNC: 7 G/DL (ref 6–8.5)
PROT UR QL STRIP: NEGATIVE
RBC # BLD AUTO: 4.65 10*6/MM3 (ref 3.77–5.28)
RBC # UR STRIP: ABNORMAL /HPF
REF LAB TEST METHOD: ABNORMAL
SODIUM SERPL-SCNC: 138 MMOL/L (ref 136–145)
SP GR UR STRIP: >=1.03 (ref 1–1.03)
SQUAMOUS #/AREA URNS HPF: ABNORMAL /HPF
UROBILINOGEN UR QL STRIP: ABNORMAL
WBC # UR STRIP: ABNORMAL /HPF
WBC NRBC COR # BLD AUTO: 8.28 10*3/MM3 (ref 3.4–10.8)
WHOLE BLOOD HOLD COAG: NORMAL
WHOLE BLOOD HOLD SPECIMEN: NORMAL

## 2025-08-14 PROCEDURE — 81001 URINALYSIS AUTO W/SCOPE: CPT

## 2025-08-14 PROCEDURE — 76815 OB US LIMITED FETUS(S): CPT

## 2025-08-14 PROCEDURE — 83690 ASSAY OF LIPASE: CPT

## 2025-08-14 PROCEDURE — 80053 COMPREHEN METABOLIC PANEL: CPT

## 2025-08-14 PROCEDURE — 99284 EMERGENCY DEPT VISIT MOD MDM: CPT | Performed by: FAMILY MEDICINE

## 2025-08-14 PROCEDURE — 85025 COMPLETE CBC W/AUTO DIFF WBC: CPT

## 2025-08-14 PROCEDURE — 84702 CHORIONIC GONADOTROPIN TEST: CPT

## 2025-08-14 RX ORDER — CEPHALEXIN 500 MG/1
500 CAPSULE ORAL 2 TIMES DAILY
Qty: 12 CAPSULE | Refills: 0 | Status: SHIPPED | OUTPATIENT
Start: 2025-08-14 | End: 2025-08-22

## 2025-08-14 RX ORDER — SODIUM CHLORIDE 0.9 % (FLUSH) 0.9 %
10 SYRINGE (ML) INJECTION AS NEEDED
Status: DISCONTINUED | OUTPATIENT
Start: 2025-08-14 | End: 2025-08-14 | Stop reason: HOSPADM

## 2025-08-14 RX ADMIN — CEPHALEXIN 500 MG: 250 CAPSULE ORAL at 21:41

## 2025-08-15 ENCOUNTER — TELEPHONE (OUTPATIENT)
Dept: OBSTETRICS AND GYNECOLOGY | Facility: CLINIC | Age: 25
End: 2025-08-15
Payer: COMMERCIAL

## 2025-08-22 ENCOUNTER — INITIAL PRENATAL (OUTPATIENT)
Dept: OBSTETRICS AND GYNECOLOGY | Facility: CLINIC | Age: 25
End: 2025-08-22
Payer: COMMERCIAL

## 2025-08-22 VITALS — SYSTOLIC BLOOD PRESSURE: 110 MMHG | DIASTOLIC BLOOD PRESSURE: 70 MMHG | BODY MASS INDEX: 33.16 KG/M2 | WEIGHT: 199 LBS

## 2025-08-22 DIAGNOSIS — Z34.80 SUPERVISION OF OTHER NORMAL PREGNANCY, ANTEPARTUM: Primary | ICD-10-CM

## 2025-08-22 DIAGNOSIS — Z3A.09 9 WEEKS GESTATION OF PREGNANCY: ICD-10-CM

## 2025-08-24 LAB
ABO GROUP BLD: NORMAL
AMPHETAMINES UR QL SCN: NEGATIVE NG/ML
APPEARANCE UR: CLEAR
BACTERIA #/AREA URNS HPF: ABNORMAL /HPF
BACTERIA UR CULT: NORMAL
BACTERIA UR CULT: NORMAL
BARBITURATES UR QL SCN: NEGATIVE NG/ML
BASOPHILS # BLD AUTO: 0.1 X10E3/UL (ref 0–0.2)
BASOPHILS NFR BLD AUTO: 1 %
BENZODIAZ UR QL SCN: NEGATIVE NG/ML
BILIRUB UR QL STRIP: NEGATIVE
BLD GP AB SCN SERPL QL: NEGATIVE
BZE UR QL SCN: NEGATIVE NG/ML
CANNABINOIDS UR QL SCN: NEGATIVE NG/ML
CASTS URNS MICRO: ABNORMAL
COLOR UR: YELLOW
CREAT UR-MCNC: 60.4 MG/DL (ref 20–300)
EOSINOPHIL # BLD AUTO: 0.1 X10E3/UL (ref 0–0.4)
EOSINOPHIL NFR BLD AUTO: 1 %
EPI CELLS #/AREA URNS HPF: ABNORMAL /HPF
ERYTHROCYTE [DISTWIDTH] IN BLOOD BY AUTOMATED COUNT: 12.8 % (ref 11.7–15.4)
GLUCOSE UR QL STRIP: NEGATIVE
HBV SURFACE AG SERPL QL IA: NEGATIVE
HCT VFR BLD AUTO: 42.2 % (ref 34–46.6)
HCV IGG SERPL QL IA: NON REACTIVE
HGB BLD-MCNC: 13.6 G/DL (ref 11.1–15.9)
HGB UR QL STRIP: NEGATIVE
HIV 1+2 AB+HIV1 P24 AG SERPL QL IA: NON REACTIVE
IMM GRANULOCYTES # BLD AUTO: 0 X10E3/UL (ref 0–0.1)
IMM GRANULOCYTES NFR BLD AUTO: 0 %
KETONES UR QL STRIP: NEGATIVE
LABORATORY COMMENT REPORT: NORMAL
LEUKOCYTE ESTERASE UR QL STRIP: NEGATIVE
LYMPHOCYTES # BLD AUTO: 2 X10E3/UL (ref 0.7–3.1)
LYMPHOCYTES NFR BLD AUTO: 24 %
MCH RBC QN AUTO: 29.8 PG (ref 26.6–33)
MCHC RBC AUTO-ENTMCNC: 32.2 G/DL (ref 31.5–35.7)
MCV RBC AUTO: 93 FL (ref 79–97)
METHADONE UR QL SCN: NEGATIVE NG/ML
MONOCYTES # BLD AUTO: 0.6 X10E3/UL (ref 0.1–0.9)
MONOCYTES NFR BLD AUTO: 7 %
NEUTROPHILS # BLD AUTO: 5.6 X10E3/UL (ref 1.4–7)
NEUTROPHILS NFR BLD AUTO: 67 %
NITRITE UR QL STRIP: NEGATIVE
OPIATES UR QL SCN: NEGATIVE NG/ML
OXYCODONE+OXYMORPHONE UR QL SCN: NEGATIVE NG/ML
PCP UR QL: NEGATIVE NG/ML
PH UR STRIP: 7 [PH] (ref 5–8)
PH UR: 6.6 [PH] (ref 4.5–8.9)
PLATELET # BLD AUTO: 257 X10E3/UL (ref 150–450)
PROPOXYPH UR QL SCN: NEGATIVE NG/ML
PROT UR QL STRIP: NEGATIVE
RBC # BLD AUTO: 4.56 X10E6/UL (ref 3.77–5.28)
RBC #/AREA URNS HPF: ABNORMAL /HPF
RH BLD: POSITIVE
RPR SER QL: NON REACTIVE
RUBV IGG SERPL IA-ACNC: 1.11 INDEX
SP GR UR STRIP: 1.01 (ref 1–1.03)
UROBILINOGEN UR STRIP-MCNC: NORMAL MG/DL
WBC # BLD AUTO: 8.4 X10E3/UL (ref 3.4–10.8)
WBC #/AREA URNS HPF: ABNORMAL /HPF

## 2025-08-25 LAB
C TRACH RRNA SPEC QL NAA+PROBE: NEGATIVE
N GONORRHOEA RRNA SPEC QL NAA+PROBE: NEGATIVE
REF LAB TEST METHOD: NORMAL

## 2025-08-27 LAB
5P15 DELETION (CRI-DU-CHAT): NOT DETECTED
CFDNA.FET/CFDNA.TOTAL SFR FETUS: NORMAL %
CITATION REF LAB TEST: NORMAL
FET 13+18+21+X+Y ANEUP PLAS.CFDNA: NEGATIVE
FET 1P36 DEL RISK WBC.DNA+CFDNA QL: NOT DETECTED
FET 22Q11.2 DEL RISK WBC.DNA+CFDNA QL: NOT DETECTED
FET CHR 11Q23 DEL PLAS.CFDNA QL: NOT DETECTED
FET CHR 15Q11 DEL PLAS.CFDNA QL: NOT DETECTED
FET CHR 21 TS PLAS.CFDNA QL: NEGATIVE
FET CHR 4P16 DEL PLAS.CFDNA QL: NOT DETECTED
FET CHR 8Q24 DEL PLAS.CFDNA QL: NOT DETECTED
FET MS X RISK WBC.DNA+CFDNA QL: NOT DETECTED
FET SEX PLAS.CFDNA DOSAGE CFDNA: NORMAL
FET TS 13 RISK PLAS.CFDNA QL: NEGATIVE
FET TS 18 RISK WBC.DNA+CFDNA QL: NEGATIVE
FET X + Y ANEUP RISK PLAS.CFDNA SEQ-IMP: NOT DETECTED
GA EST FROM CONCEPTION DATE: NORMAL D
GESTATIONAL AGE > 9:: YES
LAB DIRECTOR NAME PROVIDER: NORMAL
LAB DIRECTOR NAME PROVIDER: NORMAL
LABORATORY COMMENT REPORT: NORMAL
LIMITATIONS OF THE TEST: NORMAL
NEGATIVE PREDICTIVE VALUE: NORMAL
PERFORMANCE CHARACTERISTICS: NORMAL
POSITIVE PREDICTIVE VALUE: NORMAL
REF LAB TEST METHOD: NORMAL
SERVICE CMNT-IMP: NORMAL
TEST PERFORMANCE INFO SPEC: NORMAL
TRIOSOMY 16: NOT DETECTED
TRISOMY 22: NOT DETECTED

## 2025-08-28 ENCOUNTER — TELEPHONE (OUTPATIENT)
Dept: OBSTETRICS AND GYNECOLOGY | Facility: CLINIC | Age: 25
End: 2025-08-28
Payer: COMMERCIAL